# Patient Record
Sex: FEMALE | Race: WHITE | NOT HISPANIC OR LATINO | Employment: FULL TIME
[De-identification: names, ages, dates, MRNs, and addresses within clinical notes are randomized per-mention and may not be internally consistent; named-entity substitution may affect disease eponyms.]

---

## 2017-12-03 ENCOUNTER — HEALTH MAINTENANCE LETTER (OUTPATIENT)
Age: 47
End: 2017-12-03

## 2018-01-01 ENCOUNTER — TRANSFERRED RECORDS (OUTPATIENT)
Dept: HEALTH INFORMATION MANAGEMENT | Facility: CLINIC | Age: 48
End: 2018-01-01

## 2018-04-10 LAB
HPV ABSTRACT: NORMAL
PAP SMEAR - HIM PATIENT REPORTED: NEGATIVE

## 2018-11-05 ENCOUNTER — TELEPHONE (OUTPATIENT)
Dept: FAMILY MEDICINE | Facility: CLINIC | Age: 48
End: 2018-11-05

## 2018-11-05 NOTE — TELEPHONE ENCOUNTER
1. On and off since May 2018 - the symptoms have not worsened since May   2. Worse in am - when needing to go to the bathroom urination and BM, pressure/achiness in abdomen   3. Pain subsides after using restroom, eating, moving around getting on with her day   4. achiness low abdomen more to the right side (has not changed in months)   5. Denies nausea/vomiting/diarrhea - some constipation at times   6. Seems that it goes along with menstrual cycle, menses starting to become more irregular     Plan:   Appointment scheduled in clinic symptoms have been present for months and non urgent at this time   Patient was advised if symptoms worsen this evening to visit urgent care/ER for evaluation     Melissa Mello Registered Nurse   [facility] and [facility]

## 2018-11-06 ENCOUNTER — RADIANT APPOINTMENT (OUTPATIENT)
Dept: GENERAL RADIOLOGY | Facility: CLINIC | Age: 48
End: 2018-11-06
Attending: FAMILY MEDICINE
Payer: COMMERCIAL

## 2018-11-06 ENCOUNTER — OFFICE VISIT (OUTPATIENT)
Dept: FAMILY MEDICINE | Facility: CLINIC | Age: 48
End: 2018-11-06
Payer: COMMERCIAL

## 2018-11-06 VITALS
RESPIRATION RATE: 16 BRPM | SYSTOLIC BLOOD PRESSURE: 113 MMHG | DIASTOLIC BLOOD PRESSURE: 68 MMHG | HEIGHT: 65 IN | HEART RATE: 66 BPM | TEMPERATURE: 96.6 F | WEIGHT: 149.25 LBS | BODY MASS INDEX: 24.87 KG/M2 | OXYGEN SATURATION: 99 %

## 2018-11-06 DIAGNOSIS — R10.31 ABDOMINAL PAIN, RIGHT LOWER QUADRANT: ICD-10-CM

## 2018-11-06 DIAGNOSIS — Z78.9 VEGETARIAN: ICD-10-CM

## 2018-11-06 DIAGNOSIS — R82.90 BAD ODOR OF URINE: ICD-10-CM

## 2018-11-06 DIAGNOSIS — R10.31 RIGHT LOWER QUADRANT ABDOMINAL PAIN: Primary | ICD-10-CM

## 2018-11-06 DIAGNOSIS — N92.6 IRREGULAR MENSTRUAL CYCLE: ICD-10-CM

## 2018-11-06 DIAGNOSIS — Z13.6 ENCOUNTER FOR SCREENING FOR CARDIOVASCULAR DISORDERS: ICD-10-CM

## 2018-11-06 DIAGNOSIS — R82.90 NONSPECIFIC FINDING ON EXAMINATION OF URINE: ICD-10-CM

## 2018-11-06 DIAGNOSIS — R52 PAIN: ICD-10-CM

## 2018-11-06 DIAGNOSIS — Z28.21 INFLUENZA VACCINATION DECLINED: ICD-10-CM

## 2018-11-06 LAB
ALBUMIN UR-MCNC: NEGATIVE MG/DL
APPEARANCE UR: CLEAR
BACTERIA #/AREA URNS HPF: ABNORMAL /HPF
BASOPHILS # BLD AUTO: 0.1 10E9/L (ref 0–0.2)
BASOPHILS NFR BLD AUTO: 0.9 %
BETA HCG QUAL IFA URINE: NEGATIVE
BILIRUB UR QL STRIP: NEGATIVE
COLOR UR AUTO: YELLOW
DIFFERENTIAL METHOD BLD: ABNORMAL
EOSINOPHIL # BLD AUTO: 0.1 10E9/L (ref 0–0.7)
EOSINOPHIL NFR BLD AUTO: 0.9 %
ERYTHROCYTE [DISTWIDTH] IN BLOOD BY AUTOMATED COUNT: 16.6 % (ref 10–15)
GLUCOSE UR STRIP-MCNC: NEGATIVE MG/DL
HCT VFR BLD AUTO: 33.7 % (ref 35–47)
HGB BLD-MCNC: 10.5 G/DL (ref 11.7–15.7)
HGB UR QL STRIP: ABNORMAL
KETONES UR STRIP-MCNC: NEGATIVE MG/DL
LEUKOCYTE ESTERASE UR QL STRIP: ABNORMAL
LYMPHOCYTES # BLD AUTO: 1.7 10E9/L (ref 0.8–5.3)
LYMPHOCYTES NFR BLD AUTO: 30.1 %
MCH RBC QN AUTO: 24 PG (ref 26.5–33)
MCHC RBC AUTO-ENTMCNC: 31.2 G/DL (ref 31.5–36.5)
MCV RBC AUTO: 77 FL (ref 78–100)
MONOCYTES # BLD AUTO: 0.4 10E9/L (ref 0–1.3)
MONOCYTES NFR BLD AUTO: 6.8 %
NEUTROPHILS # BLD AUTO: 3.5 10E9/L (ref 1.6–8.3)
NEUTROPHILS NFR BLD AUTO: 61.3 %
NITRATE UR QL: NEGATIVE
NON-SQ EPI CELLS #/AREA URNS LPF: ABNORMAL /LPF
PH UR STRIP: 7 PH (ref 5–7)
PLATELET # BLD AUTO: 274 10E9/L (ref 150–450)
RBC # BLD AUTO: 4.37 10E12/L (ref 3.8–5.2)
RBC #/AREA URNS AUTO: ABNORMAL /HPF
SOURCE: ABNORMAL
SP GR UR STRIP: 1.01 (ref 1–1.03)
UROBILINOGEN UR STRIP-ACNC: 0.2 EU/DL (ref 0.2–1)
VIT B12 SERPL-MCNC: 687 PG/ML (ref 193–986)
WBC # BLD AUTO: 5.6 10E9/L (ref 4–11)
WBC #/AREA URNS AUTO: ABNORMAL /HPF

## 2018-11-06 PROCEDURE — 83550 IRON BINDING TEST: CPT | Performed by: FAMILY MEDICINE

## 2018-11-06 PROCEDURE — 84443 ASSAY THYROID STIM HORMONE: CPT | Performed by: FAMILY MEDICINE

## 2018-11-06 PROCEDURE — 80053 COMPREHEN METABOLIC PANEL: CPT | Performed by: FAMILY MEDICINE

## 2018-11-06 PROCEDURE — 82607 VITAMIN B-12: CPT | Performed by: FAMILY MEDICINE

## 2018-11-06 PROCEDURE — 99214 OFFICE O/P EST MOD 30 MIN: CPT | Performed by: FAMILY MEDICINE

## 2018-11-06 PROCEDURE — 81001 URINALYSIS AUTO W/SCOPE: CPT | Performed by: FAMILY MEDICINE

## 2018-11-06 PROCEDURE — 83540 ASSAY OF IRON: CPT | Performed by: FAMILY MEDICINE

## 2018-11-06 PROCEDURE — 87086 URINE CULTURE/COLONY COUNT: CPT | Performed by: FAMILY MEDICINE

## 2018-11-06 PROCEDURE — 36415 COLL VENOUS BLD VENIPUNCTURE: CPT | Performed by: FAMILY MEDICINE

## 2018-11-06 PROCEDURE — 85025 COMPLETE CBC W/AUTO DIFF WBC: CPT | Performed by: FAMILY MEDICINE

## 2018-11-06 PROCEDURE — 80061 LIPID PANEL: CPT | Performed by: FAMILY MEDICINE

## 2018-11-06 PROCEDURE — 73502 X-RAY EXAM HIP UNI 2-3 VIEWS: CPT

## 2018-11-06 PROCEDURE — 84703 CHORIONIC GONADOTROPIN ASSAY: CPT | Performed by: FAMILY MEDICINE

## 2018-11-06 NOTE — PROGRESS NOTES
Alice Ms. Fischer,  Some of your results came back as follows  . -Hemoglobin is decreased indicating anemia.  Anemia can cause fatigue and, occasionally, light-headedness.  This is common in menstruating women and is usually caused by an iron deficiency.  Its stable to before and will see if theer is a vit b 12 deficiency too . Rest of labs not back. -White blood cell and platelet counts are normal.  -Urine has some leucocyte esterase but not suggestive of urine infection. Will set it up for culture and see what grows. If you have any further concerns please do not hesitate to contact us by message, phone or making an appointment.  Have a good day   Dr Darius CLINE

## 2018-11-06 NOTE — PROGRESS NOTES
SUBJECTIVE:   Juana Fischer is a 47 year old female who presents to clinic today for the following health issues:    Musculoskeletal problem/pain      Duration:  MAY 2018    Description  Location: Lower right abdominal area ( pelvic)     Per triage on phone : On and off since May 2018 - the symptoms have not worsened since May. Worse in am - when needing to go to the bathroom urination and BM, pressure/achiness in abdomen. Pain subsides after using restroom, eating, moving around getting on with her day. Achiness low abdomen more to the right side (has not changed in months). Denies nausea/vomiting/diarrhea - some constipation at times. Seemed initially that it went along with menstrual cycle, menses starting to become more irregular    Intensity:  moderate    Accompanying signs and symptoms:  Moves around and some swelling, pain is stronger in the morning and decreasing during the day     History  Previous similar problem: no   Previous evaluation:  none    Precipitating or alleviating factors:  Trauma or overuse: no   Aggravating factors include: none  Therapies tried and outcome: started a food journal and tracking menstrual cycle     - Declines the  FLU VACCINE   Former smoker, new to me, limited records, prior colonoscopy for rectal bleeding in 2016 considered due to internal hemorrhoids that were not bleeding at time of scope, was advised to increase fiber and fluid at that time. And recheck in 10 yrs. [place]  negative.  Notes symptoms sort of happened suddenly but going on since may 2018. Started to get ache in pelvis, right side, thought initially was menstrual cramps, was watching that for a while monitoring when ovulating. Has noted periods getting more uneven, trying to get attention to that path. Then realized symptoms were not lining up to ovulation or premenstrual cramping. Then started looking at her diet, if Food was making her constipated or not. Has bene a vegetarian a long time and has  avoided airy. A while ago stopped wheat when had joint pain and that helped but has been eating wheat all along since then. Has not tried any food restrictions with these symptom though recently has been cutting back on fibrous foods.   Couldn't figure out any correlation with food either.  Discomfort comes and goes. Not daily, feels usually at night or early am before wakes up, feels on sitting & then goes to the bathroom, and feels after that a bit better but still there. Its sharpest in the am and as the day goes by it becomes a dull ache. Like right now ( noon time ) she feels no pain. pain is usually on the right but it sometimes seems to move to left.     Also recently feels like the walls of her uterus lining are drooping. Can feel when wipes like something is pushing out. Feels swollen. Had get a pelvic pap smear in 2018 and was told everything was normal then. Has had Hx of vaginal polyps before that were normal at a gyn office.    No fever or chills, usually always cold, energy level ebbs and flows, occasional headache, no dizziness, no double or blurry vision, no facial pain, earaches tiny bit, no sore throat, runny nose, post nasal drip, no trouble hearing, smelling, tasting or swallowing, no cough, no chest pain, trouble breathing or palpitations, No heart burn, reflux, nausea or vomiting, tends towards constipation, sometimes goes and still need to go more after that. Other times its normal, no blood in stools or black stools, no weight loss or night sweats. No dysuria, hematuria, frequency, urgency, hesitancy, incontinence, No unusual vag discharge daughter told her, her urine stinks wonders if she has an infection. No leg swelling or joint pain other than some mild Left thumb arthritis. No rash.    Has one daughter age 14 born , No complications. LMP oct 15th     Problem list and histories reviewed & adjusted, as indicated.  Additional history: as documented    Patient Active Problem List    Diagnosis     Rectal bleeding     Past Surgical History:   Procedure Laterality Date     COLONOSCOPY N/A 7/26/2016    Procedure: COLONOSCOPY;  Surgeon: Eber Burns MD;  Location:  GI       Social History   Substance Use Topics     Smoking status: Former Smoker     Quit date: 1/1/1997     Smokeless tobacco: Never Used     Alcohol use 0.0 oz/week     0 Standard drinks or equivalent per week      Comment: Rarely     Family History   Problem Relation Age of Onset     Cancer Father      lung CA, smoker     Alcoholism Father      Alcoholism Mother      Thyroid Disease Mother      Cancer Maternal Grandmother      Breast CA     Alzheimer Disease Paternal Grandmother      Diabetes No family hx of      Coronary Artery Disease No family hx of      Hypertension No family hx of      Hyperlipidemia No family hx of      Cerebrovascular Disease No family hx of      Breast Cancer No family hx of      Colon Cancer No family hx of      Prostate Cancer No family hx of      Other Cancer No family hx of      Depression No family hx of      Anxiety Disorder No family hx of      Mental Illness No family hx of      Substance Abuse No family hx of      Anesthesia Reaction No family hx of      Asthma No family hx of      Osteoporosis No family hx of      Genetic Disorder No family hx of      Obesity No family hx of      Unknown/Adopted No family hx of          No current outpatient prescriptions on file.     Allergies   Allergen Reactions     Sulfa Drugs      Recent Labs   Lab Test  11/06/18   1204  02/02/11   0955   LDL  66   --    HDL  84   --    TRIG  102   --    ALT  21  11   CR  0.79  0.80   GFRESTIMATED  77  79   GFRESTBLACK  >90  >90   POTASSIUM  4.1  4.0   TSH  2.19   --       BP Readings from Last 3 Encounters:   11/06/18 113/68   07/26/16 127/74   07/15/16 104/70    Wt Readings from Last 3 Encounters:   11/06/18 149 lb 4 oz (67.7 kg)   07/26/16 150 lb (68 kg)   07/15/16 150 lb (68 kg)                  Labs reviewed in  "EPIC    Reviewed and updated as needed this visit by clinical staff  Tobacco  Allergies  Meds  Med Hx  Surg Hx  Fam Hx  Soc Hx      Reviewed and updated as needed this visit by Provider         ROS:  Constitutional, HEENT, cardiovascular, pulmonary, GI, , musculoskeletal, neuro, skin, endocrine and psych systems are negative, except as otherwise noted.    OBJECTIVE:     /68 (BP Location: Left arm, Patient Position: Chair, Cuff Size: Adult Regular)  Pulse 66  Temp 96.6  F (35.9  C) (Tympanic)  Resp 16  Ht 5' 5.25\" (1.657 m)  Wt 149 lb 4 oz (67.7 kg)  LMP 10/15/2018  SpO2 99%  BMI 24.65 kg/m2  Body mass index is 24.65 kg/(m^2).  GENERAL: healthy, alert and no distress  EYES: Eyes grossly normal to inspection, PERRL and conjunctivae and sclerae normal  HENT: ear canals and TM's normal, nose and mouth without ulcers or lesions  NECK: no adenopathy, no asymmetry, masses, or scars and thyroid normal to palpation  RESP: lungs clear to auscultation - no rales, rhonchi or wheezes  CV: regular rate and rhythm, normal S1 S2, no S3 or S4, no murmur, click or rub, no peripheral edema and peripheral pulses strong  ABDOMEN: soft, non tender, no hepatosplenomegaly, no masses and bowel sounds normal  RLQ discomfort, no rebound, no acute abdomen, no hernia or rash seen. No CVA tenderness. SLR negative. SANDRA negative.   MS: no gross musculoskeletal defects noted, no edema  SKIN: no suspicious lesions or rashes  NEURO: Normal strength and tone, mentation intact and speech normal  PSYCH: mentation appears normal, affect normal/bright  LYMPH: no cervical, supraclavicular, axillary, or inguinal adenopathy    Diagnostic Test Results:  No results found for this or any previous visit (from the past 24 hour(s)).    ASSESSMENT/PLAN:     1. Right lower quadrant abdominal pain  DDX: appendicitis ( less likely given duration) versus ileitis/ diverticulitis/ IBD( no bleeding or fever or weight loss) versus hernia ( none seen " or felt ) versus radicular pain from back ( no back pain) versus right hip arthritis ( would not account for occasional left sided symptoms unless has arthritis there too) versus fibroid versus ovarian cyst versus constipation( has hx of this ). Currently no red flag sign. Unclear etiology/diagnosis with uncertain prognosis requiring further workup below. Will do Labs, ultrasound pelvis &, hip xray today to further evaluate. B supplements could cause odor to urine but will check UA. See back in 2 weeks. She can look up low FODMAPs on line. Looks like already started some version of thi recently.no change noted but if has a functional bowel would take 4 to 6 weeks to see any difference after elimination. Consider GI eval . Colonoscopy at least in 2016 was unremarkable then except for internal hemorrhoids. If no answer consider a ct of abdomen. Declined the flu shot.      - UA reflex to Microscopic and Culture  - CBC with platelets differential  - Comprehensive metabolic panel  - TSH with free T4 reflex  - US Pelvic Complete w Transvaginal; Future  - GASTROENTEROLOGY ADULT REF CONSULT ONLY  - Beta HCG Qual, Urine - [facility] and [facility] ([id number])  - Iron and iron binding capacity  - Vitamin B12  - Urine Microscopic    2. Irregular menstrual cycle  Could be perimenopausal.   - CBC with platelets differential  - TSH with free T4 reflex  - US Pelvic Complete w Transvaginal; Future  - Beta HCG Qual, Urine - [facility] and [facility] ([id number])    3. Vegetarian  Notes hx of anemia.   - Iron and iron binding capacity  - Vitamin B12    4. Bad odor of urine  B supplements could cause odor to urine but will check UA.   _ UA & Cx sent    5. Nonspecific finding on examination of urine  - Urine Culture Aerobic Bacterial    6. Encounter for screening for cardiovascular disorders  - Lipid panel reflex to direct LDL Fasting    7. Influenza vaccination declined    See Patient Instructions    Reena Mccoy MD  [facility]

## 2018-11-06 NOTE — PATIENT INSTRUCTIONS
Labs, ultrasound, hip xray today   b supplements can cause odor to urine  See you back in 2 week s  Look up  low FODMAPs on line

## 2018-11-06 NOTE — MR AVS SNAPSHOT
After Visit Summary   11/6/2018    Juana Fischer    MRN: [id number]           Patient Information     Date Of Birth          1970        Visit Information        Provider Department      11/6/2018 11:00 AM Reena Mccoy MD [facility]        Today's Diagnoses     Right lower quadrant abdominal pain    -  1    Irregular menstrual cycle        Need for prophylactic vaccination and inoculation against influenza        Encounter for screening for cardiovascular disorders        Vegetarian        Bad odor of urine          Care Instructions    Labs, ultrasound, hip xray today   b supplements can cause odor to urine  See you back in 2 week s  Look up  low FODMAPs on line               Follow-ups after your visit        Additional Services     GASTROENTEROLOGY ADULT REF CONSULT ONLY       Preferred Location: [facility] [phone], [facility] [phone] and [facility] [phone]      Please be aware that coverage of these services is subject to the terms and limitations of your health insurance plan.  Call member services at your health plan with any benefit or coverage questions.  Any procedures must be performed at a [place] facility OR coordinated by your clinic's referral office.    Please bring the following with you to your appointment:    (1) Any X-Rays, CTs or MRIs which have been performed.  Contact the facility where they were done to arrange for  prior to your scheduled appointment.    (2) List of current medications   (3) This referral request   (4) Any documents/labs given to you for this referral                  Follow-up notes from your care team     Return in about 2 weeks (around 11/20/2018) for Physical Exam with PCP of choice.      Your next 10 appointments already scheduled     Nov 09, 2018  1:45 PM CST   US PELVIC COMPLETE W TRANSVAGINAL with [id number]   [facility] ([facility])    [address] [id number] [phone]           How do I prepare for my exam? (Food and drink instructions) Adults: Drink four 8-ounce glasses of fluid an hour before your exam. If you need to empty your bladder before your exam, try to release only a little urine. Then, drink another glass of fluid.  Children: * Children who are potty trained up to 6 years old should drink at least 2 cups (16 oz) of water/non-carbonated beverage 30 minutes prior to the exam. * Children who are 6-10 years should drink at least 3 cups (24 oz) of water/non-carbonated beverage 45 minutes prior to the exam. * Children who are 10 years or older should drink at least 4 cups (32 oz) of water/non-carbonated beverage 45 minutes prior to the exam.  If your child is very uncomfortable or has an urgent need to pee, please notify a technologist; they will try to find out how much longer the wait may be and provide instructions to help relieve the pressure.  What should I wear: Wear comfortable clothes.  How long does the exam take: Most ultrasounds take 30 to 60 minutes.  What should I bring: Bring a list of your medicines, including vitamins, minerals and over-the-counter drugs. It is safest to leave personal items at home.  Do I need a :  No  is needed.  What do I need to tell my doctor: Tell your doctor about any allergies you may have.  What should I do after the exam: No restrictions, You may resume normal activities.  What is this test: An ultrasound uses sound waves to make pictures of the body. Sound waves do not cause pain. The only discomfort may be the pressure of the wand against your skin or full bladder.  Who should I call with questions: If you have any questions, please call the Imaging Department where you will have your exam. Directions, parking instructions, and other information is available on our website, [email or web address].            Nov 26, 2018  1:40 PM CST   PHYSICAL with  "Reena Mccoy MD   [facility]    [address] [place] [id number] [phone]              Future tests that were ordered for you today     Open Future Orders        Priority Expected Expires Ordered    US Pelvic Complete w Transvaginal Routine  11/6/2019 11/6/2018            Who to contact     If you have questions or need follow up information about today's clinic visit or your schedule please contact [facility] directly at [phone].  Normal or non-critical lab and imaging results will be communicated to you by [employer], letter or phone within 4 business days after the clinic has received the results. If you do not hear from us within 7 days, please contact the clinic through "[employer]"t or phone. If you have a critical or abnormal lab result, we will notify you by phone as soon as possible.  Submit refill requests through [employer] or call your pharmacy and they will forward the refill request to us. Please allow 3 business days for your refill to be completed.          Additional Information About Your Visit        [employer] Information     [employer] gives you secure access to your electronic health record. If you see a primary care provider, you can also send messages to your care team and make appointments. If you have questions, please call your primary care clinic.  If you do not have a primary care provider, please call [phone] and they will assist you.        Care EveryWhere ID     This is your Care EveryWhere ID. This could be used by other organizations to access your [facility] medical records  [id number]        Your Vitals Were     Pulse Temperature Respirations Height Last Period Pulse Oximetry    66 96.6  F (35.9  C) (Tympanic) 16 5' 5.25\" (1.657 m) 10/15/2018 99%    BMI (Body Mass Index)                   24.65 kg/m2            Blood Pressure from Last 3 Encounters:   11/06/18 113/68   07/26/16 127/74   07/15/16 104/70    " Weight from Last 3 Encounters:   11/06/18 149 lb 4 oz (67.7 kg)   07/26/16 150 lb (68 kg)   07/15/16 150 lb (68 kg)              We Performed the Following     Beta HCG Qual, Urine - [facility] and [facility] ([id number])     CBC with platelets differential     Comprehensive metabolic panel     GASTROENTEROLOGY ADULT REF CONSULT ONLY     Iron and iron binding capacity     Lipid panel reflex to direct LDL Fasting     TSH with free T4 reflex     UA reflex to Microscopic and Culture     UA reflex to Microscopic and Culture     Vitamin B12        Primary Care Provider Office Phone # Fax #    Reena Mccoy [phone]       [address]  [facility] [address]        Equal Access to Services     JACKI NASCIMENTO : Hadii yoli Garcia, wastacyda maco, lisseth kaalmada rhett, bryan rashid . So [facility] [phone].    ATENCIÓN: Si habla español, tiene a rao disposición servicios gratuitos de asistencia lingüística. [facility] [phone].    We comply with applicable federal civil rights laws and [place] laws. We do not discriminate on the basis of race, color, national origin, age, disability, sex, sexual orientation, or gender identity.            Thank you!     Thank you for choosing [facility]  for your care. Our goal is always to provide you with excellent care. Hearing back from our patients is one way we can continue to improve our services. Please take a few minutes to complete the written survey that you may receive in the mail after your visit with us. Thank you!             Your Updated Medication List - Protect others around you: Learn how to safely use, store and throw away your medicines at www.disposemymeds.org.      Notice  As of 11/6/2018 11:56 AM    You have not been prescribed any medications.

## 2018-11-06 NOTE — PROGRESS NOTES
Alice Fischer,  Your results came back and  Urine microscopy suggests collection contamination due to presence of skin cells. Will see what culture grows . If we see any bacteria growing on culture then would make a recommendation to take an antibiotic at that time.  If you have any further concerns please do not hesitate to contact us by message, phone or making an appointment.  Have a good day   Dr Darius CLINE

## 2018-11-07 PROBLEM — Z12.4 CERVICAL CANCER SCREENING: Status: ACTIVE | Noted: 2018-04-17

## 2018-11-07 LAB
ALBUMIN SERPL-MCNC: 3.7 G/DL (ref 3.4–5)
ALP SERPL-CCNC: 58 U/L (ref 40–150)
ALT SERPL W P-5'-P-CCNC: 21 U/L (ref 0–50)
ANION GAP SERPL CALCULATED.3IONS-SCNC: 11 MMOL/L (ref 3–14)
AST SERPL W P-5'-P-CCNC: 16 U/L (ref 0–45)
BACTERIA SPEC CULT: NORMAL
BACTERIA SPEC CULT: NORMAL
BILIRUB SERPL-MCNC: 0.2 MG/DL (ref 0.2–1.3)
BUN SERPL-MCNC: 8 MG/DL (ref 7–30)
CALCIUM SERPL-MCNC: 8.8 MG/DL (ref 8.5–10.1)
CHLORIDE SERPL-SCNC: 105 MMOL/L (ref 94–109)
CHOLEST SERPL-MCNC: 170 MG/DL
CO2 SERPL-SCNC: 24 MMOL/L (ref 20–32)
CREAT SERPL-MCNC: 0.79 MG/DL (ref 0.52–1.04)
GFR SERPL CREATININE-BSD FRML MDRD: 77 ML/MIN/1.7M2
GLUCOSE SERPL-MCNC: 77 MG/DL (ref 70–99)
HDLC SERPL-MCNC: 84 MG/DL
IRON SATN MFR SERPL: 5 % (ref 15–46)
IRON SERPL-MCNC: 23 UG/DL (ref 35–180)
LDLC SERPL CALC-MCNC: 66 MG/DL
NONHDLC SERPL-MCNC: 86 MG/DL
POTASSIUM SERPL-SCNC: 4.1 MMOL/L (ref 3.4–5.3)
PROT SERPL-MCNC: 7.4 G/DL (ref 6.8–8.8)
SODIUM SERPL-SCNC: 140 MMOL/L (ref 133–144)
SPECIMEN SOURCE: NORMAL
TIBC SERPL-MCNC: 462 UG/DL (ref 240–430)
TRIGL SERPL-MCNC: 102 MG/DL
TSH SERPL DL<=0.005 MIU/L-ACNC: 2.19 MU/L (ref 0.4–4)

## 2018-11-07 NOTE — PROGRESS NOTES
Alice Fischer,  Your results came back showing some right hip arthritis. We can talk about referral to ortho when I see you back for this.  If you have any further concerns please do not hesitate to contact us by message, phone or making an appointment.  Have a good day   Dr Darius CLINE

## 2018-11-07 NOTE — PROGRESS NOTES
Alice Fischer,  Your final urine culture results came back and show no infection.  If you have any further concerns please do not hesitate to contact us by message, phone or making an appointment.  Have a good day   Dr Darius CLINE

## 2018-11-07 NOTE — PROGRESS NOTES
Alice Ms. Peresnadja,  Your results came back and are within acceptable limits. -Liver and gallbladder tests are normal (ALT,AST, Alk phos, bilirubin), kidney function is normal (Cr, GFR), sodium is normal, potassium is normal, calcium is normal, glucose is normal.  -LDL(bad) cholesterol level is normal  -TSH (thyroid stimulating hormone) level is normal which indicates normal thyroid function.. If you have any further concerns please do not hesitate to contact us by message, phone or making an appointment.  Have a good day   Dr Darius CLINE

## 2018-11-07 NOTE — PROGRESS NOTES
Alice Fischer,  Your results show you have low iron levels. . If you have any further concerns please do not hesitate to contact us by message, phone or making an appointment.  Have a good day   Dr Darius CLINE

## 2018-11-07 NOTE — PROGRESS NOTES
Alice Fischer,  Your results came back with a normal vitamin B 12.  If you have any further concerns please do not hesitate to contact us by message, phone or making an appointment.  Have a good day   Dr Darius CLINE

## 2018-11-09 ENCOUNTER — TELEPHONE (OUTPATIENT)
Dept: FAMILY MEDICINE | Facility: CLINIC | Age: 48
End: 2018-11-09

## 2018-11-09 ENCOUNTER — RADIANT APPOINTMENT (OUTPATIENT)
Dept: ULTRASOUND IMAGING | Facility: CLINIC | Age: 48
End: 2018-11-09
Attending: FAMILY MEDICINE
Payer: COMMERCIAL

## 2018-11-09 DIAGNOSIS — R10.31 RIGHT LOWER QUADRANT ABDOMINAL PAIN: ICD-10-CM

## 2018-11-09 DIAGNOSIS — N92.6 IRREGULAR MENSTRUAL CYCLE: ICD-10-CM

## 2018-11-09 DIAGNOSIS — N85.00 ENDOMETRIAL HYPERPLASIA: Primary | ICD-10-CM

## 2018-11-09 NOTE — TELEPHONE ENCOUNTER
Left message on machine to call back.  Ask to speak to an RN, let them know it's a return call.    Leave a number and time that you can be reached.   Carisa Collins RN

## 2018-11-09 NOTE — TELEPHONE ENCOUNTER
Message  below given to patient     She will await call from OBGYN to schedule and if she has not heard from by next week she will return call to clinic    Melissa Mello Registered Nurse   [facility] and [facility]

## 2018-11-09 NOTE — TELEPHONE ENCOUNTER
Covering for Dr Mccoy as DOD.    Please call patient - has endometrial hyperplasia.  She should see Dr. Deluca for follow-up.  I have done a referral for her.  It may be an incidental finding and it may not be the main cause of her abdominal pain but OB/GYN should see her as endometrial thickness is more than usual.      Results for orders placed or performed in visit on 11/09/18   US Pelvic Complete w Transvaginal    Narrative    EXAMINATION: US PELVIC COMPLETE WITH TRANSVAGINAL, 11/9/2018 2:38 PM     COMPARISON: None.    HISTORY: Right lower quadrant abdominal pain. Irregular menstrual  cycle.    TECHNIQUE: The pelvis was scanned in standard fashion with  transabdominal and transvaginal transducer(s) using both grey scale  and color Doppler techniques.    FINDINGS  The uterus measures 10.3 x 4.7 x 6.2 cm, and there is no evidence of a  focal fibroid. There are tiny subendometrial cystic changes with  endometrial/myometrial indistinctness. Endometrium measures 22 mm,  demonstrates mild heterogeneity. Small amount of free fluid adjacent  the right ovary. Small cervical nabothian cysts.    The right ovary measures 4.0 x 2.5 x 2.3 and the left ovary measures  1.3 x 1.2 x 2.0. Several prominent simple ovarian cysts with the  largest measuring 2.1 x 2.1 x 2.1 cm without internal septations or  vascularity. There is normal blood flow to the ovaries.        Impression    IMPRESSION:   Abnormally thickened endometrium with mild heterogeneity, measuring up  to 22 mm. Consider OB/GYN consultation, if not already obtained.    Few subendometrial cysts, along with enlarged/bulbous uterus. These  findings can be seen with adenomyosis.    I have personally reviewed the examination and initial interpretation  and I agree with the findings.    RUSSELL OROZCO MD

## 2018-11-25 ASSESSMENT — ENCOUNTER SYMPTOMS
JOINT SWELLING: 0
NERVOUS/ANXIOUS: 0
FEVER: 0
MYALGIAS: 1
HEMATURIA: 0
HEADACHES: 0
COUGH: 0
NAUSEA: 0
DYSURIA: 0
CHILLS: 0
SHORTNESS OF BREATH: 0
EYE PAIN: 0
PALPITATIONS: 0
PARESTHESIAS: 0
BREAST MASS: 0
HEARTBURN: 0
ARTHRALGIAS: 1
ABDOMINAL PAIN: 1
WEAKNESS: 1
CONSTIPATION: 1
FREQUENCY: 0
DIZZINESS: 0
SORE THROAT: 0
HEMATOCHEZIA: 1
DIARRHEA: 1

## 2018-11-26 ENCOUNTER — OFFICE VISIT (OUTPATIENT)
Dept: FAMILY MEDICINE | Facility: CLINIC | Age: 48
End: 2018-11-26
Payer: COMMERCIAL

## 2018-11-26 VITALS
BODY MASS INDEX: 24.91 KG/M2 | WEIGHT: 149.5 LBS | RESPIRATION RATE: 13 BRPM | SYSTOLIC BLOOD PRESSURE: 121 MMHG | OXYGEN SATURATION: 100 % | TEMPERATURE: 97.7 F | DIASTOLIC BLOOD PRESSURE: 78 MMHG | HEIGHT: 65 IN | HEART RATE: 76 BPM

## 2018-11-26 DIAGNOSIS — Z78.9 VEGETARIAN: ICD-10-CM

## 2018-11-26 DIAGNOSIS — N92.6 IRREGULAR MENSTRUAL CYCLE: ICD-10-CM

## 2018-11-26 DIAGNOSIS — Z28.21 INFLUENZA VACCINATION DECLINED: ICD-10-CM

## 2018-11-26 DIAGNOSIS — Z00.00 ROUTINE HISTORY AND PHYSICAL EXAMINATION OF ADULT: Primary | ICD-10-CM

## 2018-11-26 DIAGNOSIS — K62.5 RECTAL BLEEDING: ICD-10-CM

## 2018-11-26 DIAGNOSIS — R53.1 WEAKNESS GENERALIZED: ICD-10-CM

## 2018-11-26 DIAGNOSIS — M79.10 MYALGIA: ICD-10-CM

## 2018-11-26 DIAGNOSIS — R10.31 ABDOMINAL PAIN, RIGHT LOWER QUADRANT: ICD-10-CM

## 2018-11-26 DIAGNOSIS — R19.8 ALTERNATING CONSTIPATION AND DIARRHEA: ICD-10-CM

## 2018-11-26 DIAGNOSIS — M25.50 ARTHRALGIA, UNSPECIFIED JOINT: ICD-10-CM

## 2018-11-26 DIAGNOSIS — R10.2 PELVIC PAIN IN FEMALE: ICD-10-CM

## 2018-11-26 DIAGNOSIS — N93.9 VAGINAL BLEEDING: ICD-10-CM

## 2018-11-26 DIAGNOSIS — R93.89 ENDOMETRIAL THICKENING ON ULTRASOUND: ICD-10-CM

## 2018-11-26 DIAGNOSIS — D50.0 IRON DEFICIENCY ANEMIA DUE TO CHRONIC BLOOD LOSS: ICD-10-CM

## 2018-11-26 DIAGNOSIS — M16.11 ARTHRITIS OF RIGHT HIP: ICD-10-CM

## 2018-11-26 PROCEDURE — 99396 PREV VISIT EST AGE 40-64: CPT | Performed by: FAMILY MEDICINE

## 2018-11-26 PROCEDURE — 99214 OFFICE O/P EST MOD 30 MIN: CPT | Mod: 25 | Performed by: FAMILY MEDICINE

## 2018-11-26 ASSESSMENT — ENCOUNTER SYMPTOMS
DIZZINESS: 0
PARESTHESIAS: 0
HEADACHES: 0
FEVER: 0
BREAST MASS: 0
ARTHRALGIAS: 1
DIARRHEA: 1
FREQUENCY: 0
CONSTIPATION: 1
COUGH: 0
HEMATURIA: 0
MYALGIAS: 1
SORE THROAT: 0
WEAKNESS: 1
DYSURIA: 0
NAUSEA: 0
CHILLS: 0
HEARTBURN: 0
EYE PAIN: 0
HEMATOCHEZIA: 1
JOINT SWELLING: 0
SHORTNESS OF BREATH: 0
PALPITATIONS: 0
ABDOMINAL PAIN: 1
NERVOUS/ANXIOUS: 0

## 2018-11-26 NOTE — PROGRESS NOTES
"   SUBJECTIVE:   CC: Juana Fischer is an 48 year old woman who presents for preventive health visit.     Healthy Habits:    Do you get at least three servings of calcium containing foods daily (dairy, green leafy vegetables, etc.)? { :675045::\"yes\"}    Amount of exercise or daily activities, outside of work: { :946885}    Problems taking medications regularly { :568233::\"No\"}    Medication side effects: { :504457::\"No\"}    Have you had an eye exam in the past two years? { :748431}    Do you see a dentist twice per year? { :310081}    Do you have sleep apnea, excessive snoring or daytime drowsiness?{ :965292}  {Outside tests to abstract? :335577}    {additional problems to add (Optional):658569}    Today's PHQ-2 Score:   PHQ-2 ( 1999 [employer]) 11/25/2018 11/6/2018   Q1: Little interest or pleasure in doing things 0 0   Q2: Feeling down, depressed or hopeless 0 0   PHQ-2 Score 0 0   Q1: Little interest or pleasure in doing things Not at all -   Q2: Feeling down, depressed or hopeless Not at all -   PHQ-2 Score 0 -     {PHQ-2 LOOK IN ASSESSMENTS (Optional) :014936}  Abuse: Current or Past(Physical, Sexual or Emotional)- {YES/NO/NA:073879}  Do you feel safe in your environment? {YES/NO/NA:457209}    Social History   Substance Use Topics     Smoking status: Former Smoker     Quit date: 1/1/1997     Smokeless tobacco: Never Used     Alcohol use 0.0 oz/week     0 Standard drinks or equivalent per week      Comment: Rarely     If you drink alcohol do you typically have >3 drinks per day or >7 drinks per week? {ETOH :423281}                     Reviewed orders with patient.  Reviewed health maintenance and updated orders accordingly - {Yes/No:431415::\"Yes\"}  {Chronicprobdata (Optional):667017}    {Mammo Decision Support (Optional):642551}    Pertinent mammograms are reviewed under the imaging tab.  History of abnormal Pap smear: {PAP HX:285075}     Reviewed and updated as needed this visit by clinical staff  Tobacco  " "Allergies  Meds  Med Hx  Surg Hx  Fam Hx  Soc Hx        Reviewed and updated as needed this visit by Provider        {HISTORY OPTIONS (Optional):088921}    ROS:  { :936260}    OBJECTIVE:   There were no vitals taken for this visit.  EXAM:  {Exam Choices:101998}    {Diagnostic Test Results (Optional):555980::\"Diagnostic Test Results:\",\"none \"}    ASSESSMENT/PLAN:   {Diag Picklist:490306}    COUNSELING:   {FEMALE COUNSELING MESSAGES:971021::\"Reviewed preventive health counseling, as reflected in patient instructions\"}    BP Readings from Last 1 Encounters:   11/06/18 113/68     Estimated body mass index is 24.65 kg/(m^2) as calculated from the following:    Height as of 11/6/18: 5' 5.25\" (1.657 m).    Weight as of 11/6/18: 149 lb 4 oz (67.7 kg).    {BP Counseling- Complete if BP >= 120/80  (Optional):739152}  {Weight Management Plan (ACO) Complete if BMI is abnormal-  Ages 18-64  BMI >24.9.  Age 65+ with BMI <23 or >30 (Optional):892568}     reports that she quit smoking about 21 years ago. She has never used smokeless tobacco.  {Tobacco Cessation -- Complete if patient is a smoker (Optional):479961}    Counseling Resources:  ATP IV Guidelines  Pooled Cohorts Equation Calculator  Breast Cancer Risk Calculator  FRAX Risk Assessment  ICSI Preventive Guidelines  Dietary Guidelines for Americans, 2010  USDA's MyPlate  ASA Prophylaxis  Lung CA Screening    Reena Mccoy MD  [facility]  "

## 2018-11-26 NOTE — PATIENT INSTRUCTIONS
Breast exam normal  mammogram starting age 50  Pelvic/pap/ HPV due 4/2021  See gyn for endometrial thickening  See Gi for for gut issues  See ortho for right hip pain  See hematology after that for anemia  recheck iron, ferritin and cbc in 2 months on increased iron through diet and or supplements     Preventive Health Recommendations  Female Ages 40 to 49    Yearly exam:     See your health care provider every year in order to  1. Review health changes.   2. Discuss preventive care.    3. Review your medicines if your doctor prescribed any.      Get a Pap test every three years (unless you have an abnormal result and your provider advises testing more often).      If you get Pap tests with HPV test, you only need to test every 5 years, unless you have an abnormal result. You do not need a Pap test if your uterus was removed (hysterectomy) and you have not had cancer.      You should be tested each year for STDs (sexually transmitted diseases), if you're at risk.     Ask your doctor if you should have a mammogram.      Have a colonoscopy (test for colon cancer) if someone in your family has had colon cancer or polyps before age 50.       Have a cholesterol test every 5 years.       Have a diabetes test (fasting glucose) after age 45. If you are at risk for diabetes, you should have this test every 3 years.    Shots: Get a flu shot each year. Get a tetanus shot every 10 years.     Nutrition:     Eat at least 5 servings of fruits and vegetables each day.    Eat whole-grain bread, whole-wheat pasta and brown rice instead of white grains and rice.    Get adequate Calcium and Vitamin D.      Lifestyle    Exercise at least 150 minutes a week (an average of 30 minutes a day, 5 days a week). This will help you control your weight and prevent disease.    Limit alcohol to one drink per day.    No smoking.     Wear sunscreen to prevent skin cancer.    See your dentist every six months for an exam and cleaning.

## 2018-11-26 NOTE — PROGRESS NOTES
SUBJECTIVE:   CC: Juana Fischer is an 48 year old woman who presents for preventive health visit.     Physical   Annual:     Getting at least 3 servings of Calcium per day:  Yes    Bi-annual eye exam:  Yes    Dental care twice a year:  Yes    Sleep apnea or symptoms of sleep apnea:  None    Diet:  Vegetarian/vegan and Gluten-free/reduced    Frequency of exercise:  2-3 days/week    Duration of exercise:  15-30 minutes    Taking medications regularly:  Not Applicable    Additional concerns today:  Yes    PHQ-2 Total Score: 0    Medical assistant advised patient about addressing additional health concerns that could be billed, as it is not considered a part of a preventive physical. Patient verbalized understanding.  Reena Mccoy MD on 11/26/2018 at 1:51 PM  - Declines FLU VACCINE     Pt is also here to go over lab results form last OV    Feels mostly good. Headache today as getting her period, No fever or chills, no  dizziness, no double or blurry vision, no facial pain, earache, sore throat, runny nose, post nasal drip, no trouble hearing, smelling, tasting or swallowing, no cough , no chest pain, trouble breathing or palpitations, RLQ pain as before not unbearable, no heart burn, reflux, nausea or vomiting has alternating constipation and diarrhea, , pelvic pain, rectal blood bright red small amount on wiping only, prior colonoscopy normal except for int hemorrhoids, body aches at time, low energy, no blood in stools or black stools, no weight loss or night sweats. No dysuria, hematuria, frequency, urgency, hesitancy, incontinence, No leg swelling or joint pain. No rash.    Former smoker, history of anemia long-standing diagnosed at age 5 and then chronically anemic lost track of it initially as a child for 9 fortified cereal than not addressed until there was an adult and came up as a diagnosis, eustachian tube dysfunction right, right foot pain, resolved pleuritis, right hip pain, bright red blood per  rectum status post colonoscopy which showed internal hemorrhoids non bleeding in 2016, allergic to sulfa drugs, seen for the first time November 6, 2018 for right lower quadrant pain, irregular menstrual bleeding, pelvic pain, rectal bleeding, alternating constipation and diarrhea, body aches, generalized weakness.  He is a vegetarian.  Workup revealed a normal exam.  CBC showed mild anemia hemoglobin of 10.5 with microcytic hypochromic indices and low iron of 23, normal B12, x-ray showed mild right hip arthritis worse since 2013 and CMP, lipids, TSH were normal and urine culture done for questionable UA was unremarkable. pelvic u/S 11/9 showed an abnormally thickened endometrium with mild heterogeneity, measuring up to 22 mm. Advised to consider OB/GYN consultation,    Did look at the low foot maps diet did not notice any change but has not tried it long enough.  Wondering about a probiotic and seen GI.  Declines mammogram for now no family history of breast cancer except in grandmother who got it in her old age.  Pelvic Pap up-to-date and due again in 2021.  Declines flu shot and HIV testing.  Notes that her insurance is changing and she got a new job to start in December and will be switching to [facility] [employer].  So declines any referrals currently will proceed with him with the new insurance.  Has a trouble taking iron supplements as they just make her constipation worse.  Plus being a vegetarian it is hard for her to get vegetarian iron supplements that worked for her.  Would be interested in seeing a hematologist to workup to GYN and GI comfort and I think.  Is wondering about endometriosis.    Today's PHQ-2 Score:   PHQ-2 ( 1999 Pf[employer]) 11/26/2018   Q1: Little interest or pleasure in doing things 0   Q2: Feeling down, depressed or hopeless 0   PHQ-2 Score 0   Q1: Little interest or pleasure in doing things -   Q2: Feeling down, depressed or hopeless -   PHQ-2 Score -       Abuse: Current or Past(Physical,  Sexual or Emotional)- No  Do you feel safe in your environment? Yes    Social History   Substance Use Topics     Smoking status: Former Smoker     Quit date: 1/1/1997     Smokeless tobacco: Never Used     Alcohol use 0.0 oz/week     0 Standard drinks or equivalent per week      Comment: Rarely     Alcohol Use 11/25/2018   If you drink alcohol do you typically have greater than 3 drinks per day OR greater than 7 drinks per week? No   No flowsheet data found.    Reviewed orders with patient.  Reviewed health maintenance and updated orders accordingly - Yes  Labs reviewed in EPIC  BP Readings from Last 3 Encounters:   11/26/18 121/78   11/06/18 113/68   07/26/16 127/74    Wt Readings from Last 3 Encounters:   11/26/18 149 lb 8 oz (67.8 kg)   11/06/18 149 lb 4 oz (67.7 kg)   07/26/16 150 lb (68 kg)                  Patient Active Problem List   Diagnosis     Cervical cancer screening     Endometrial thickening on ultrasound     Iron deficiency anemia due to chronic blood loss     Irregular menstrual cycle     Pelvic pain in female     Abdominal pain, right lower quadrant     Alternating constipation and diarrhea     Vegetarian     Arthritis of right hip     Past Surgical History:   Procedure Laterality Date     COLONOSCOPY N/A 7/26/2016    Procedure: COLONOSCOPY;  Surgeon: Eber Burns MD;  Location:  GI       Social History   Substance Use Topics     Smoking status: Former Smoker     Quit date: 1/1/1997     Smokeless tobacco: Never Used     Alcohol use 0.0 oz/week     0 Standard drinks or equivalent per week      Comment: Rarely     Family History   Problem Relation Age of Onset     Cancer Father      lung CA, smoker     Alcoholism Father      Alcoholism Mother      Thyroid Disease Mother      Cancer Maternal Grandmother      Breast CA     Alzheimer Disease Paternal Grandmother      Diabetes No family hx of      Coronary Artery Disease No family hx of      Hypertension No family hx of      Hyperlipidemia No  family hx of      Cerebrovascular Disease No family hx of      Breast Cancer No family hx of      Colon Cancer No family hx of      Prostate Cancer No family hx of      Other Cancer No family hx of      Depression No family hx of      Anxiety Disorder No family hx of      Mental Illness No family hx of      Substance Abuse No family hx of      Anesthesia Reaction No family hx of      Asthma No family hx of      Osteoporosis No family hx of      Genetic Disorder No family hx of      Obesity No family hx of      Unknown/Adopted No family hx of          No current outpatient prescriptions on file.     Allergies   Allergen Reactions     Sulfa Drugs Rash     Recent Labs   Lab Test  18   1204  11   0955   LDL  66   --    HDL  84   --    TRIG  102   --    ALT  21  11   CR  0.79  0.80   GFRESTIMATED  77  79   GFRESTBLACK  >90  >90   POTASSIUM  4.1  4.0   TSH  2.19   --         Mammogram Screening: Patient over age 50, mutual decision to screen reflected in health maintenance.    Pertinent mammograms are reviewed under the imaging tab.  History of abnormal Pap smear: NO - age 30-65 PAP every 5 years with negative HPV co-testing recommended  Last 3 Pap Results: No results found for: PAP     Reviewed and updated as needed this visit by clinical staff  Tobacco  Allergies  Meds  Problems  Med Hx  Surg Hx  Fam Hx  Soc Hx          Reviewed and updated as needed this visit by Provider  Tobacco  Allergies  Meds  Problems  Med Hx  Surg Hx  Fam Hx  Soc Hx         Past Medical History:   Diagnosis Date     Anemia 1985     Eustachian tube dysfunction      Pleuritis      Rectal bleed 2016     Rectal bleeding 7/15/2016     Right foot pain 2017    xray neg     Right hip pain       Past Surgical History:   Procedure Laterality Date     COLONOSCOPY N/A 2016    Procedure: COLONOSCOPY;  Surgeon: Eber Burns MD;  Location:  GI     Obstetric History       T1      L1      "SAB0   TAB0   Ectopic0   Multiple0   Live Births0       # Outcome Date GA Lbr Jaycob/2nd Weight Sex Delivery Anes PTL Lv   1 Term                   Review of Systems   Constitutional: Negative for chills and fever.   HENT: Negative for congestion, ear pain, hearing loss and sore throat.    Eyes: Negative for pain and visual disturbance.   Respiratory: Negative for cough and shortness of breath.    Cardiovascular: Negative for chest pain, palpitations and peripheral edema.   Gastrointestinal: Positive for abdominal pain, constipation, diarrhea and hematochezia. Negative for heartburn and nausea.   Breasts:  Positive for tenderness. Negative for breast mass and discharge.   Genitourinary: Positive for pelvic pain and vaginal bleeding. Negative for dysuria, frequency, genital sores, hematuria, urgency and vaginal discharge.   Musculoskeletal: Positive for arthralgias and myalgias. Negative for joint swelling.   Skin: Negative for rash.   Neurological: Positive for weakness. Negative for dizziness, headaches and paresthesias.   Psychiatric/Behavioral: Negative for mood changes. The patient is not nervous/anxious.       OBJECTIVE:   /78 (BP Location: Left arm, Patient Position: Chair, Cuff Size: Adult Regular)  Pulse 76  Temp 97.7  F (36.5  C) (Oral)  Resp 13  Ht 5' 5\" (1.651 m)  Wt 149 lb 8 oz (67.8 kg)  LMP 11/25/2018  SpO2 100%  BMI 24.88 kg/m2  Physical Exam  GENERAL: healthy, alert and no distress  EYES: Eyes grossly normal to inspection, PERRL and conjunctivae and sclerae normal  HENT: ear canals and TM's normal, nose and mouth without ulcers or lesions  NECK: no adenopathy, no asymmetry, masses, or scars and thyroid normal to palpation  RESP: lungs clear to auscultation - no rales, rhonchi or wheezes  BREAST: normal without masses, tenderness or nipple discharge and no palpable axillary masses or adenopathy  CV: regular rate and rhythm, normal S1 S2, no S3 or S4, no murmur, click or rub, no peripheral " edema and peripheral pulses strong  ABDOMEN: soft, nontender, no hepatosplenomegaly, no masses and bowel sounds normal  MS: no gross musculoskeletal defects noted, no edema  SKIN: no suspicious lesions or rashes  NEURO: Normal strength and tone, mentation intact and speech normal  PSYCH: mentation appears normal, affect normal/bright    Diagnostic Test Results:  No results found for this or any previous visit (from the past 24 hour(s)).    ASSESSMENT/PLAN:   1. Routine history and physical examination of adult  Breast exam normal. Mammogram starting age 50. Pelvic/pap/ HPV due 4/2021. All diagnostics explained. Is switching to [facility] due to new job so will establish with new PCP and get further referrals as needed from them. Declines flu shot. No indication for HIV testing. See gyn for endometrial thickening and EMB and also can be evaluated for endometriosis as cause for pelvic pain by them See Gi for for gut issues. Not had a long enough trial on low FODMAPs diet but also being vegetarian diet is very restrictive. Can do probiotics. See ortho for right hip pain, may be playing a role in her RLQ pain.   See hematology after that for anemia if cause not determined by GI or gyn. Continue as best she can with dietary and supplemental iron. Currently symptoms do no warrant an iron infusion or transfusion. Hb stable to what she has had in the pats. Recommend rechecking iron, ferritin and cbc in 2 months of increased iron through diet and or supplements     2. Endometrial thickening on ultrasound  - OB/GYN REFERRAL  - OFFICE/OUTPT VISIT,EST,LEVL III    3. Iron deficiency anemia due to chronic blood loss  Longstanding. No worse hb than prior. Possibly form menorrhagia, vegetarianism and some gut malabsorption hard to say. Iron is hard on her digestion and makes constipation worse. See hematology after that for anemia if cause not determined by GI or gyn. Continue as best she can with dietary and supplemental  iron. Currently symptoms do no warrant an iron infusion or transfusion. Hb stable to what she has had in the pats. Recommend rechecking iron, ferritin and cbc in 2 months of increased iron through diet and or supplements   - OB/GYN REFERRAL  - OFFICE/OUTPT VISIT,EST,LEVL III  - ONC/HEME ADULT REFERRAL    4. Irregular menstrual cycle  - OB/GYN REFERRAL  - OFFICE/OUTPT VISIT,EST,LEVL III    5. Vaginal bleeding  May be cause of her anemia. Menstrual cycles irregular with 4 days of moderate bleeding and last up to a week with spotting before and after  - OB/GYN REFERRAL  - OFFICE/OUTPT VISIT,EST,LEVL III    6. Rectal bleeding  BRBPR on ly on wiping, minimal , prior colonoscopy 2016 was unremarkable except for small non bleeding internal hemorrhoids, GI to evaluate   - GASTROENTEROLOGY ADULT REF CONSULT ONLY  - OFFICE/OUTPT VISIT,EST,LEVL III    7. Pelvic pain in female  See gyn for endometriosis eval  - OB/GYN REFERRAL  - GASTROENTEROLOGY ADULT REF CONSULT ONLY  - OFFICE/OUTPT VISIT,EST,LEVL III    8. Abdominal pain, right lower quadrant  Could be form IBS versus right hip arthritis or endometriosis , hard to say . Work up as above. Exam benigna dn labs reassuring and has has no unintentional weight loss.   - OB/GYN REFERRAL  - GASTROENTEROLOGY ADULT REF CONSULT ONLY  - OFFICE/OUTPT VISIT,EST,LEVL III    9. Alternating constipation and diarrhea  Suspect IBS  - GASTROENTEROLOGY ADULT REF CONSULT ONLY  - OFFICE/OUTPT VISIT,EST,LEVL III    10. Vegetarian  May be contributing to anemia.   - ONC/HEME ADULT REFERRAL    11. Arthralgia, unspecified joint  Could be related to anemia  - ORTHO  REFERRAL  - OFFICE/OUTPT VISIT,EST,LEVL III    12. Myalgia  Could be related to anemia  - ORTHO  REFERRAL  - OFFICE/OUTPT VISIT,EST,LEVL III    13. Arthritis of right hip  - ORTHO  REFERRAL  - OFFICE/OUTPT VISIT,EST,LEVL III    14. Weakness generalized  Low energy from anemia. No red flag symptoms or signs.  -  "OFFICE/OUTPT VISIT,ESTAIMEE III    15. Influenza vaccination declined    COUNSELING:  Reviewed preventive health counseling, as reflected in patient instructions       Regular exercise       Healthy diet/nutrition       Vision screening       Immunizations    Declined: Influenza    BP Readings from Last 1 Encounters:   11/26/18 121/78     Estimated body mass index is 24.88 kg/(m^2) as calculated from the following:    Height as of this encounter: 5' 5\" (1.651 m).    Weight as of this encounter: 149 lb 8 oz (67.8 kg).           reports that she quit smoking about 21 years ago. She has never used smokeless tobacco.      Counseling Resources:  ATP IV Guidelines  Pooled Cohorts Equation Calculator  Breast Cancer Risk Calculator  FRAX Risk Assessment  ICSI Preventive Guidelines  Dietary Guidelines for Americans, 2010  USDA's MyPlate  ASA Prophylaxis  Lung CA Screening    Reena Mccoy MD  [facility]  "

## 2018-11-26 NOTE — MR AVS SNAPSHOT
After Visit Summary   11/26/2018    Juana Fischer    MRN: [id number]           Patient Information     Date Of Birth          1970        Visit Information        Provider Department      11/26/2018 1:40 PM Reena Mccoy MD [facility]        Today's Diagnoses     Routine history and physical examination of adult    -  1    Endometrial thickening on ultrasound        Iron deficiency anemia due to chronic blood loss        Irregular menstrual cycle        Vaginal bleeding        Rectal bleeding        Pelvic pain in female        Abdominal pain, right lower quadrant        Alternating constipation and diarrhea        Vegetarian        Arthralgia, unspecified joint        Myalgia        Arthritis of right hip        Weakness generalized        Influenza vaccination declined          Care Instructions    Breast exam normal  mammogram starting age 50  Pelvic/pap/ HPV due 4/2021  See gyn for endometrial thickening  See Gi for for gut issues  See ortho for right hip pain  See hematology after that for anemia  recheck iron, ferritin and cbc in 2 months on increased iron through diet and or supplements     Preventive Health Recommendations  Female Ages 40 to 49    Yearly exam:     See your health care provider every year in order to  1. Review health changes.   2. Discuss preventive care.    3. Review your medicines if your doctor prescribed any.      Get a Pap test every three years (unless you have an abnormal result and your provider advises testing more often).      If you get Pap tests with HPV test, you only need to test every 5 years, unless you have an abnormal result. You do not need a Pap test if your uterus was removed (hysterectomy) and you have not had cancer.      You should be tested each year for STDs (sexually transmitted diseases), if you're at risk.     Ask your doctor if you should have a mammogram.      Have a colonoscopy (test for colon cancer) if someone in  your family has had colon cancer or polyps before age 50.       Have a cholesterol test every 5 years.       Have a diabetes test (fasting glucose) after age 45. If you are at risk for diabetes, you should have this test every 3 years.    Shots: Get a flu shot each year. Get a tetanus shot every 10 years.     Nutrition:     Eat at least 5 servings of fruits and vegetables each day.    Eat whole-grain bread, whole-wheat pasta and brown rice instead of white grains and rice.    Get adequate Calcium and Vitamin D.      Lifestyle    Exercise at least 150 minutes a week (an average of 30 minutes a day, 5 days a week). This will help you control your weight and prevent disease.    Limit alcohol to one drink per day.    No smoking.     Wear sunscreen to prevent skin cancer.    See your dentist every six months for an exam and cleaning.          Follow-ups after your visit        Additional Services     GASTROENTEROLOGY ADULT REF CONSULT ONLY       Preferred Location: [facility] [phone], [facility]: [phone] and [facility] [phone]      Please be aware that coverage of these services is subject to the terms and limitations of your health insurance plan.  Call member services at your health plan with any benefit or coverage questions.  Any procedures must be performed at a [facility] facility OR coordinated by your clinic's referral office.    Please bring the following with you to your appointment:    (1) Any X-Rays, CTs or MRIs which have been performed.  Contact the facility where they were done to arrange for  prior to your scheduled appointment.    (2) List of current medications   (3) This referral request   (4) Any documents/labs given to you for this referral            OB/GYN REFERRAL       Your provider has referred you to:  [facility]: [facility] [phone]   [email or web address]    Please be aware that coverage of these  services is subject to the terms and limitations of your health insurance plan.  Call member services at your health plan with any benefit or coverage questions.      Please bring the following with you to your appointment:    (1) Any X-Rays, CTs or MRIs which have been performed.  Contact the facility where they were done to arrange for  prior to your scheduled appointment.  Any new CT, MRI or other procedures ordered by your specialist must be performed at a [facility] facility or coordinated by your clinic's referral office.    (2) List of current medications   (3) This referral request   (4) Any documents/labs given to you for this referral            ONC/HEME ADULT REFERRAL       Your provider has referred you to: [facility]: Cancer Care/Hematology (All Cancer Related Services) [facility] [phone]   [email or web address]  [place] [phone]   [email or web address]    Please be aware that coverage of these services is subject to the terms and limitations of your health insurance plan.  Call member services at your health plan with any benefit or coverage questions.      Please bring the following with you to your appointment:    (1) Any X-Rays, CTs or MRIs which have been performed.  Contact the facility where they were done to arrange for  prior to your scheduled appointment.   (2) List of current medications  (3) This referral request   (4) Any documents/labs given to you for this referral            ORTHO  REFERRAL       [facility] is referring you to the Orthopedic  Services at [facility].       The  Representative will assist you in the coordination of your Orthopedic and Musculoskeletal Care as prescribed by your physician.    The  Representative will call you within 1 business day to help schedule your appointment, or you  may contact the [facility] Representative at:    All areas ~ [phone]     Type of Referral : Non Surgical / Sport Medicine       Timeframe requested: 3 - 5 days    Coverage of these services is subject to the terms and limitations of your health insurance plan.  Please call member services at your health plan with any benefit or coverage questions.      If X-rays, CT or MRI's have been performed, please contact the facility where they were done to arrange for , prior to your scheduled appointment.  Please bring this referral request to your appointment and present it to your specialist.                  Follow-up notes from your care team     Return in about 2 months (around 1/26/2019) for Routine Visit for chronic issues with PCP.      Who to contact     If you have questions or need follow up information about today's clinic visit or your schedule please contact [facility] directly at [phone].  Normal or non-critical lab and imaging results will be communicated to you by MyChart, letter or phone within 4 business days after the clinic has received the results. If you do not hear from us within 7 days, please contact the clinic through [employer] or phone. If you have a critical or abnormal lab result, we will notify you by phone as soon as possible.  Submit refill requests through [employer] or call your pharmacy and they will forward the refill request to us. Please allow 3 business days for your refill to be completed.          Additional Information About Your Visit        [employer] Information     [employer] gives you secure access to your electronic health record. If you see a primary care provider, you can also send messages to your care team and make appointments. If you have questions, please call your primary care clinic.  If you do not have a primary care provider, please call [phone] and they will assist you.        Care EveryWhere ID     This is your Care EveryWhere ID. This  "could be used by other organizations to access your [facility] medical records  [id number]        Your Vitals Were     Pulse Temperature Respirations Height Last Period Pulse Oximetry    76 97.7  F (36.5  C) (Oral) 13 5' 5\" (1.651 m) 11/25/2018 100%    BMI (Body Mass Index)                   24.88 kg/m2            Blood Pressure from Last 3 Encounters:   11/26/18 121/78   11/06/18 113/68   07/26/16 127/74    Weight from Last 3 Encounters:   11/26/18 149 lb 8 oz (67.8 kg)   11/06/18 149 lb 4 oz (67.7 kg)   07/26/16 150 lb (68 kg)              We Performed the Following     GASTROENTEROLOGY ADULT REF CONSULT ONLY     OB/GYN REFERRAL     OFFICE/OUTPT VISIT,EST,LEVL III     ONC/HEME ADULT REFERRAL     ORTHO  REFERRAL        Primary Care Provider Office Phone # Fax #    Reena Mccoy [phone] [address] [address]        Equal Access to Services     JACKI NASCIMENTO : Hadii aad ku hadasho Soomaali, waaxda luqadaha, qaybta kaalmada adeegyada, waxay nishain tiffany rashid . So [facility] [phone].    ATENCIÓN: Si habla español, tiene a rao disposición servicios gratuitos de asistencia lingüística. [facility] [phone].    We comply with applicable federal civil rights laws and [place] laws. We do not discriminate on the basis of race, color, national origin, age, disability, sex, sexual orientation, or gender identity.            Thank you!     Thank you for choosing [facility]  for your care. Our goal is always to provide you with excellent care. Hearing back from our patients is one way we can continue to improve our services. Please take a few minutes to complete the written survey that you may receive in the mail after your visit with us. Thank you!             Your Updated Medication List - Protect others around you: Learn how to safely use, store and throw away your medicines at www.disposemymeds.org.      Notice  As of 11/26/2018  2:24 PM    You " have not been prescribed any medications.

## 2020-03-01 ENCOUNTER — HEALTH MAINTENANCE LETTER (OUTPATIENT)
Age: 50
End: 2020-03-01

## 2020-12-14 ENCOUNTER — HEALTH MAINTENANCE LETTER (OUTPATIENT)
Age: 50
End: 2020-12-14

## 2021-03-10 ENCOUNTER — OFFICE VISIT (OUTPATIENT)
Dept: OBGYN | Facility: CLINIC | Age: 51
End: 2021-03-10
Attending: OBSTETRICS & GYNECOLOGY
Payer: COMMERCIAL

## 2021-03-10 VITALS
WEIGHT: 154.7 LBS | HEIGHT: 66 IN | BODY MASS INDEX: 24.86 KG/M2 | HEART RATE: 76 BPM | DIASTOLIC BLOOD PRESSURE: 81 MMHG | SYSTOLIC BLOOD PRESSURE: 115 MMHG

## 2021-03-10 DIAGNOSIS — Z11.3 ROUTINE SCREENING FOR STI (SEXUALLY TRANSMITTED INFECTION): ICD-10-CM

## 2021-03-10 DIAGNOSIS — Z00.00 VISIT FOR PREVENTIVE HEALTH EXAMINATION: Primary | ICD-10-CM

## 2021-03-10 LAB — T PALLIDUM AB SER QL: NONREACTIVE

## 2021-03-10 PROCEDURE — 36415 COLL VENOUS BLD VENIPUNCTURE: CPT | Performed by: OBSTETRICS & GYNECOLOGY

## 2021-03-10 PROCEDURE — 87591 N.GONORRHOEAE DNA AMP PROB: CPT | Performed by: OBSTETRICS & GYNECOLOGY

## 2021-03-10 PROCEDURE — 87491 CHLMYD TRACH DNA AMP PROBE: CPT | Performed by: OBSTETRICS & GYNECOLOGY

## 2021-03-10 PROCEDURE — G0463 HOSPITAL OUTPT CLINIC VISIT: HCPCS | Mod: 25

## 2021-03-10 PROCEDURE — 99386 PREV VISIT NEW AGE 40-64: CPT | Mod: GE | Performed by: OBSTETRICS & GYNECOLOGY

## 2021-03-10 PROCEDURE — 87389 HIV-1 AG W/HIV-1&-2 AB AG IA: CPT | Performed by: OBSTETRICS & GYNECOLOGY

## 2021-03-10 PROCEDURE — 86780 TREPONEMA PALLIDUM: CPT | Performed by: OBSTETRICS & GYNECOLOGY

## 2021-03-10 RX ORDER — MULTIPLE VITAMINS W/ MINERALS TAB 9MG-400MCG
1 TAB ORAL DAILY
COMMUNITY
End: 2022-06-23

## 2021-03-10 SDOH — HEALTH STABILITY: MENTAL HEALTH: HOW MANY STANDARD DRINKS CONTAINING ALCOHOL DO YOU HAVE ON A TYPICAL DAY?: 1 OR 2

## 2021-03-10 SDOH — HEALTH STABILITY: MENTAL HEALTH: HOW OFTEN DO YOU HAVE 6 OR MORE DRINKS ON ONE OCCASION?: NEVER

## 2021-03-10 SDOH — HEALTH STABILITY: MENTAL HEALTH: HOW OFTEN DO YOU HAVE A DRINK CONTAINING ALCOHOL?: MONTHLY OR LESS

## 2021-03-10 ASSESSMENT — ANXIETY QUESTIONNAIRES
1. FEELING NERVOUS, ANXIOUS, OR ON EDGE: SEVERAL DAYS
6. BECOMING EASILY ANNOYED OR IRRITABLE: NOT AT ALL
3. WORRYING TOO MUCH ABOUT DIFFERENT THINGS: NOT AT ALL
GAD7 TOTAL SCORE: 2
7. FEELING AFRAID AS IF SOMETHING AWFUL MIGHT HAPPEN: NOT AT ALL
2. NOT BEING ABLE TO STOP OR CONTROL WORRYING: SEVERAL DAYS
5. BEING SO RESTLESS THAT IT IS HARD TO SIT STILL: NOT AT ALL

## 2021-03-10 ASSESSMENT — MIFFLIN-ST. JEOR: SCORE: 1333.46

## 2021-03-10 ASSESSMENT — PAIN SCALES - GENERAL: PAINLEVEL: NO PAIN (0)

## 2021-03-10 ASSESSMENT — PATIENT HEALTH QUESTIONNAIRE - PHQ9
SUM OF ALL RESPONSES TO PHQ QUESTIONS 1-9: 0
5. POOR APPETITE OR OVEREATING: NOT AT ALL

## 2021-03-10 NOTE — PATIENT INSTRUCTIONS

## 2021-03-10 NOTE — PROGRESS NOTES
[facility]  Gynecology Visit    CC: establish care     HPI:    Juana Fischer is a 50 year old , here to establish care. Juana says that she has been doing well, no specific complaints. She says that she is overall healthy. She is starting to get irregular periods, thinks she is nearing menopause. She would like to be tested for STIs. No other requests today.     Obstetrics History:  OB History    Para Term  AB Living   2 1 1 0 1 1   SAB TAB Ectopic Multiple Live Births   1 0 0 0 1      # Outcome Date GA Lbr Jaycob/2nd Weight Sex Delivery Anes PTL Lv   2 Term 04 40w0d  3.43 kg (7 lb 9 oz) F Vag-Spont   FERNANDEZ      Name: Carolina   2004               Gynecologic History:  - LMP: Patient's last menstrual period was 2021.  - Last Pap: NILM, HPV: negative (2018)  - Denies any history of abnormal pap smears  - Denies prior cervical surgery or procedures  - Denies any history of frequent UTIs, vaginal infections, or STIs  - Menses: cycles irregular can be every few months, just had two that were two months apart. Overall light flow, tolerable cramps   - Contraception: condoms  - Sexual Activity: two male partners over past year    Past Medical History:  Denies    Past Surgical History:  Past Surgical History:   Procedure Laterality Date     COLONOSCOPY N/A 2016    Procedure: COLONOSCOPY;  Surgeon: Eber Burns MD;  [facility]       Current Medications:  Multivitamin     Allergies:  Sulfa drugs    Social History:   Recently   Occasional alcohol use   Denies tobacco or drug use    Family History:  Family History   Problem Relation Age of Onset     Cancer Father         lung CA, smoker     Alcoholism Father      Alcoholism Mother      Thyroid Disease Mother      Diverticulitis Mother      Cancer Maternal Grandmother         Breast CA     Alzheimer Disease Paternal Grandmother      Diabetes No family hx of      Coronary Artery Disease No family hx of   "    Hypertension No family hx of      Hyperlipidemia No family hx of      Cerebrovascular Disease No family hx of      Breast Cancer No family hx of      Colon Cancer No family hx of      Prostate Cancer No family hx of      Other Cancer No family hx of      Depression No family hx of      Anxiety Disorder No family hx of      Mental Illness No family hx of      Substance Abuse No family hx of      Anesthesia Reaction No family hx of      Asthma No family hx of      Osteoporosis No family hx of      Genetic Disorder No family hx of      Obesity No family hx of      Unknown/Adopted No family hx of      ROS:  10-point ROS negative except as in HPI     Physical Exam  /81   Pulse 76   Ht 1.676 m (5' 6\")   Wt 70.2 kg (154 lb 11.2 oz)   LMP 2021   BMI 24.97 kg/m    Gen: Well-appearing, NAD  HEENT: Normocephalic, atraumatic  CV:  RRR, no m/r/g auscultated  Pulm: CTAB, no w/r/r auscultated  Abd: Soft, non-tender, non-distended  Ext: No LE edema, extremities warm and well perfused    Breast Exam:  Breast: Without visible skin changes. No dimpling or lesions seen.   Breasts supple, non-tender with palpation, no dominant mass, nodularity, or nipple discharge noted bilaterally. Axillary nodes negative.      Pelvic Exam:  EG/BUS: Normal genital architecture without lesions, erythema or abnormal secretions Bartholin's, Urethra, [place]'s normal   Urethral meatus: normal   Urethra: no masses, tenderness, or scarring   Bladder: no masses or tenderness   Vagina: moist, pink, rugae with creamy, white and odorless  secretions  Cervix: no lesions  Uterus: anteverted,  and small, smooth, firm, mobile w/o pain  Adnexa: Within normal limits and No masses, nodularity, tenderness  Rectum:anus normal     I have reviewed labs and imaging    Assessment/Plan  Juana Fischer is a 50 year old  female here to establish care. Patient is overall doing well, with no specific complaints.     # Preventative health  - " Cervical cancer screening- NILM, HPV: negative (4/2018), next due 4/2023  - Breast cancer screening- Declines mammogram at this time. Concerned about false positives. Discussed that mammograms are important screening tool to detect breast cancer. Patient will think about it and call if she would like mammogram ordered   - Colon cancer screening- up to date with colonoscopy  - STI testing: Gc/Ch, RPR, HIV. Okay with results via Mychart  - Additional teaching done at this visit regarding self breast exam, exercise, birth control, perimenopause, mental health and weight/diet.    Patient staffed with Dr. Vasyl Khan MD  OB/GYN Resident, PGY-4  3/10/2021, 9:55 AM    The Patient was seen in Resident Continuity Clinic by DARIUS KHAN.  I reviewed the history & exam. Assessment and plan were jointly made.    Lexy Fallon MD

## 2021-03-10 NOTE — LETTER
3/10/2021       RE: Juana Fischer  [address]  [place] [address]     Dear Colleague,    Thank you for referring your patient, Juana Fischer, to the [facility] [place] at [facility]. Please see a copy of my visit note below.    [facility]  Gynecology Visit    CC: establish care     HPI:    Juana Fischer is a 50 year old , here to establish care. Juana says that she has been doing well, no specific complaints. She says that she is overall healthy. She is starting to get irregular periods, thinks she is nearing menopause. She would like to be tested for STIs. No other requests today.     Obstetrics History:  OB History    Para Term  AB Living   2 1 1 0 1 1   SAB TAB Ectopic Multiple Live Births   1 0 0 0 1      # Outcome Date GA Lbr Jaycob/2nd Weight Sex Delivery Anes PTL Lv   2 Term 04 40w0d  3.43 kg (7 lb 9 oz) F Vag-Spont   FERNANDEZ      Name: Carolina   2004               Gynecologic History:  - LMP: Patient's last menstrual period was 2021.  - Last Pap: NILM, HPV: negative (2018)  - Denies any history of abnormal pap smears  - Denies prior cervical surgery or procedures  - Denies any history of frequent UTIs, vaginal infections, or STIs  - Menses: cycles irregular can be every few months, just had two that were two months apart. Overall light flow, tolerable cramps   - Contraception: condoms  - Sexual Activity: two male partners over past year    Past Medical History:  Denies    Past Surgical History:  Past Surgical History:   Procedure Laterality Date     COLONOSCOPY N/A 2016    Procedure: COLONOSCOPY;  Surgeon: Eber Burns MD;  Location:  GI       Current Medications:  Multivitamin     Allergies:  Sulfa drugs    Social History:   Recently   Occasional alcohol use   Denies tobacco or drug use    Family History:  Family History   Problem Relation  "Age of Onset     Cancer Father         lung CA, smoker     Alcoholism Father      Alcoholism Mother      Thyroid Disease Mother      Diverticulitis Mother      Cancer Maternal Grandmother         Breast CA     Alzheimer Disease Paternal Grandmother      Diabetes No family hx of      Coronary Artery Disease No family hx of      Hypertension No family hx of      Hyperlipidemia No family hx of      Cerebrovascular Disease No family hx of      Breast Cancer No family hx of      Colon Cancer No family hx of      Prostate Cancer No family hx of      Other Cancer No family hx of      Depression No family hx of      Anxiety Disorder No family hx of      Mental Illness No family hx of      Substance Abuse No family hx of      Anesthesia Reaction No family hx of      Asthma No family hx of      Osteoporosis No family hx of      Genetic Disorder No family hx of      Obesity No family hx of      Unknown/Adopted No family hx of      ROS:  10-point ROS negative except as in HPI     Physical Exam  /81   Pulse 76   Ht 1.676 m (5' 6\")   Wt 70.2 kg (154 lb 11.2 oz)   LMP 02/25/2021   BMI 24.97 kg/m    Gen: Well-appearing, NAD  HEENT: Normocephalic, atraumatic  CV:  RRR, no m/r/g auscultated  Pulm: CTAB, no w/r/r auscultated  Abd: Soft, non-tender, non-distended  Ext: No LE edema, extremities warm and well perfused    Breast Exam:  Breast: Without visible skin changes. No dimpling or lesions seen.   Breasts supple, non-tender with palpation, no dominant mass, nodularity, or nipple discharge noted bilaterally. Axillary nodes negative.      Pelvic Exam:  EG/BUS: Normal genital architecture without lesions, erythema or abnormal secretions Bartholin's, Urethra, [place]'s normal   Urethral meatus: normal   Urethra: no masses, tenderness, or scarring   Bladder: no masses or tenderness   Vagina: moist, pink, rugae with creamy, white and odorless  secretions  Cervix: no lesions  Uterus: anteverted,  and small, smooth, firm, mobile " w/o pain  Adnexa: Within normal limits and No masses, nodularity, tenderness  Rectum:anus normal     I have reviewed labs and imaging    Assessment/Plan  Juana Fischer is a 50 year old  female here to establish care. Patient is overall doing well, with no specific complaints.     # Preventative health  - Cervical cancer screening- NILM, HPV: negative (2018), next due 2023  - Breast cancer screening- Declines mammogram at this time. Concerned about false positives. Discussed that mammograms are important screening tool to detect breast cancer. Patient will think about it and call if she would like mammogram ordered   - Colon cancer screening- up to date with colonoscopy  - STI testing: Gc/Ch, RPR, HIV. Okay with results via [facility]  - Additional teaching done at this visit regarding self breast exam, exercise, birth control, perimenopause, mental health and weight/diet.    Patient staffed with Dr. Vasyl Khan MD  OB/GYN Resident, PGY-4  3/10/2021, 9:55 AM

## 2021-03-11 LAB
C TRACH DNA SPEC QL NAA+PROBE: NEGATIVE
HIV 1+2 AB+HIV1 P24 AG SERPL QL IA: NONREACTIVE
N GONORRHOEA DNA SPEC QL NAA+PROBE: NEGATIVE
SPECIMEN SOURCE: NORMAL
SPECIMEN SOURCE: NORMAL

## 2021-03-11 ASSESSMENT — ANXIETY QUESTIONNAIRES: GAD7 TOTAL SCORE: 2

## 2021-04-17 ENCOUNTER — HEALTH MAINTENANCE LETTER (OUTPATIENT)
Age: 51
End: 2021-04-17

## 2021-08-27 ENCOUNTER — OFFICE VISIT (OUTPATIENT)
Dept: DERMATOLOGY | Facility: CLINIC | Age: 51
End: 2021-08-27
Payer: COMMERCIAL

## 2021-08-27 VITALS — DIASTOLIC BLOOD PRESSURE: 81 MMHG | SYSTOLIC BLOOD PRESSURE: 123 MMHG | OXYGEN SATURATION: 99 % | HEART RATE: 59 BPM

## 2021-08-27 DIAGNOSIS — D22.9 NEVUS: Primary | ICD-10-CM

## 2021-08-27 DIAGNOSIS — L82.1 SEBORRHEIC KERATOSIS: ICD-10-CM

## 2021-08-27 DIAGNOSIS — D18.01 ANGIOMA OF SKIN: ICD-10-CM

## 2021-08-27 DIAGNOSIS — L81.4 LENTIGO: ICD-10-CM

## 2021-08-27 PROCEDURE — 99203 OFFICE O/P NEW LOW 30 MIN: CPT | Performed by: PHYSICIAN ASSISTANT

## 2021-08-27 NOTE — LETTER
8/27/2021         RE: Juana Fischer  [address]  [place] [address]        Dear Colleague,    Thank you for referring your patient, Juana Fischer, to the [facility]. Please see a copy of my visit note below.    HPI:   Chief complaints: Juana Fischer is a pleasant 50 year old female who presents for Full skin cancer screening to rule out skin cancer   Last Skin Exam: n/a      1st Baseline: yes  Personal HX of Skin Cancer: no   Personal HX of Malignant Melanoma: no   Family HX of Skin Cancer / Malignant Melanoma: no  Personal HX of Atypical Moles:   no  Risk factors: history of sun exposure and burns  New / Changing lesions:yes few areas  Social History: lives in [place] by the [employer]. Works in [employer]  On review of systems, there are no further skin complaints, patient is feeling otherwise well.   ROS of the following were done and are negative: Constitutional, Eyes, Ears, Nose,   Mouth, Throat, Cardiovascular, Respiratory, GI, Genitourinary, Musculoskeletal,   Psychiatric, Endocrine, Allergic/Immunologic.    PHYSICAL EXAM:   /81   Pulse 59   SpO2 99%   Breastfeeding No   Skin exam performed as follows: Type 2 skin. Mood appropriate  Alert and Oriented X 3. Well developed, well nourished in no distress.  General appearance: Normal  Head including face: Normal  Eyes: conjunctiva and lids: Normal  Mouth: Lips, teeth, gums: Normal  Neck: Normal  Chest-breast/axillae: Normal  Back: Normal  Spleen and liver: Normal  Cardiovascular: Exam of peripheral vascular system by observation for swelling, varicosities, edema: Normal  Genitalia: groin, buttocks: Normal  Extremities: digits/nails (clubbing): Normal  Eccrine and Apocrine glands: Normal  Right upper extremity: Normal  Left upper extremity: Normal  Right lower extremity: Normal  Left lower extremity: Normal  Skin: Scalp and body hair: See below    Pt deferred exam of breasts, groin,  buttocks: No    Other physical findings:  1. Multiple pigmented macules on extremities and trunk  2. Multiple pigmented macules on face, trunk and extremities  3. Multiple vascular papules on trunk, arms and legs  4. Multiple scattered keratotic plaques       Except as noted above, no other signs of skin cancer or melanoma.     ASSESSMENT/PLAN:   Benign Full skin cancer screening today. . Patient with history of none  Advised on monthly self exams and 1 year  Patient Education: Appropriate brochures given.    1. Multiple benign appearing nevi on arms, legs and trunk. Discussed ABCDEs of melanoma and sunscreen.   2. Multiple lentigos on arms, legs and trunk. Advised benign, no treatment needed.  3. Multiple scattered angiomas. Advised benign, no treatment needed.   4. Seborrheic keratosis on arms, legs and trunk. Advised benign, no treatment needed.              Follow-up: FSE every 1-2 years/PRN sooner    1.) Patient was asked about new and changing moles. YES  2.) Patient received a complete physical skin examination: YES  3.) Patient was counseled to perform a monthly self skin examination: YES  Scribed By: Susy Canas, MS, PA-C          Again, thank you for allowing me to participate in the care of your patient.        Sincerely,        Susy Canas PA-C

## 2021-08-27 NOTE — PROGRESS NOTES
HPI:   Chief complaints: Juana Fischer is a pleasant 50 year old female who presents for Full skin cancer screening to rule out skin cancer   Last Skin Exam: n/a      1st Baseline: yes  Personal HX of Skin Cancer: no   Personal HX of Malignant Melanoma: no   Family HX of Skin Cancer / Malignant Melanoma: no  Personal HX of Atypical Moles:   no  Risk factors: history of sun exposure and burns  New / Changing lesions:yes few areas  Social History: lives in [facility] by the [facility]. Works in accounting  On review of systems, there are no further skin complaints, patient is feeling otherwise well.   ROS of the following were done and are negative: Constitutional, Eyes, Ears, Nose,   Mouth, Throat, Cardiovascular, Respiratory, GI, Genitourinary, Musculoskeletal,   Psychiatric, Endocrine, Allergic/Immunologic.    PHYSICAL EXAM:   /81   Pulse 59   SpO2 99%   Breastfeeding No   Skin exam performed as follows: Type 2 skin. Mood appropriate  Alert and Oriented X 3. Well developed, well nourished in no distress.  General appearance: Normal  Head including face: Normal  Eyes: conjunctiva and lids: Normal  Mouth: Lips, teeth, gums: Normal  Neck: Normal  Chest-breast/axillae: Normal  Back: Normal  Spleen and liver: Normal  Cardiovascular: Exam of peripheral vascular system by observation for swelling, varicosities, edema: Normal  Genitalia: groin, buttocks: Normal  Extremities: digits/nails (clubbing): Normal  Eccrine and Apocrine glands: Normal  Right upper extremity: Normal  Left upper extremity: Normal  Right lower extremity: Normal  Left lower extremity: Normal  Skin: Scalp and body hair: See below    Pt deferred exam of breasts, groin, buttocks: No    Other physical findings:  1. Multiple pigmented macules on extremities and trunk  2. Multiple pigmented macules on face, trunk and extremities  3. Multiple vascular papules on trunk, arms and legs  4. Multiple scattered keratotic plaques       Except as noted  above, no other signs of skin cancer or melanoma.     ASSESSMENT/PLAN:   Benign Full skin cancer screening today. . Patient with history of none  Advised on monthly self exams and 1 year  Patient Education: Appropriate brochures given.    1. Multiple benign appearing nevi on arms, legs and trunk. Discussed ABCDEs of melanoma and sunscreen.   2. Multiple lentigos on arms, legs and trunk. Advised benign, no treatment needed.  3. Multiple scattered angiomas. Advised benign, no treatment needed.   4. Seborrheic keratosis on arms, legs and trunk. Advised benign, no treatment needed.              Follow-up: FSE every 1-2 years/PRN sooner    1.) Patient was asked about new and changing moles. YES  2.) Patient received a complete physical skin examination: YES  3.) Patient was counseled to perform a monthly self skin examination: YES  Scribed By: Susy Canas MS, PAKristiC

## 2021-10-02 ENCOUNTER — HEALTH MAINTENANCE LETTER (OUTPATIENT)
Age: 51
End: 2021-10-02

## 2022-05-09 ENCOUNTER — LAB REQUISITION (OUTPATIENT)
Dept: LAB | Facility: CLINIC | Age: 52
End: 2022-05-09

## 2022-05-09 DIAGNOSIS — R10.31 RIGHT LOWER QUADRANT PAIN: ICD-10-CM

## 2022-05-09 PROCEDURE — 87086 URINE CULTURE/COLONY COUNT: CPT | Performed by: PHYSICIAN ASSISTANT

## 2022-05-12 LAB — BACTERIA UR CULT: NORMAL

## 2022-05-14 ENCOUNTER — OFFICE VISIT (OUTPATIENT)
Dept: URGENT CARE | Facility: URGENT CARE | Age: 52
End: 2022-05-14
Payer: COMMERCIAL

## 2022-05-14 ENCOUNTER — HEALTH MAINTENANCE LETTER (OUTPATIENT)
Age: 52
End: 2022-05-14

## 2022-05-14 VITALS
HEART RATE: 75 BPM | BODY MASS INDEX: 24.86 KG/M2 | SYSTOLIC BLOOD PRESSURE: 133 MMHG | WEIGHT: 154 LBS | TEMPERATURE: 98.1 F | DIASTOLIC BLOOD PRESSURE: 83 MMHG | OXYGEN SATURATION: 99 %

## 2022-05-14 DIAGNOSIS — R10.31 RLQ ABDOMINAL PAIN: Primary | ICD-10-CM

## 2022-05-14 LAB
ALBUMIN UR-MCNC: NEGATIVE MG/DL
APPEARANCE UR: CLEAR
BACTERIA #/AREA URNS HPF: ABNORMAL /HPF
BASOPHILS # BLD AUTO: 0.1 10E3/UL (ref 0–0.2)
BASOPHILS NFR BLD AUTO: 1 %
BILIRUB UR QL STRIP: NEGATIVE
COLOR UR AUTO: YELLOW
EOSINOPHIL # BLD AUTO: 0 10E3/UL (ref 0–0.7)
EOSINOPHIL NFR BLD AUTO: 1 %
ERYTHROCYTE [DISTWIDTH] IN BLOOD BY AUTOMATED COUNT: 12.1 % (ref 10–15)
GLUCOSE UR STRIP-MCNC: NEGATIVE MG/DL
HCG UR QL: NEGATIVE
HCT VFR BLD AUTO: 42.7 % (ref 35–47)
HGB BLD-MCNC: 14.4 G/DL (ref 11.7–15.7)
HGB UR QL STRIP: ABNORMAL
IMM GRANULOCYTES # BLD: 0 10E3/UL
IMM GRANULOCYTES NFR BLD: 0 %
KETONES UR STRIP-MCNC: NEGATIVE MG/DL
LEUKOCYTE ESTERASE UR QL STRIP: ABNORMAL
LYMPHOCYTES # BLD AUTO: 1.5 10E3/UL (ref 0.8–5.3)
LYMPHOCYTES NFR BLD AUTO: 29 %
MCH RBC QN AUTO: 29.5 PG (ref 26.5–33)
MCHC RBC AUTO-ENTMCNC: 33.7 G/DL (ref 31.5–36.5)
MCV RBC AUTO: 88 FL (ref 78–100)
MONOCYTES # BLD AUTO: 0.3 10E3/UL (ref 0–1.3)
MONOCYTES NFR BLD AUTO: 6 %
NEUTROPHILS # BLD AUTO: 3.4 10E3/UL (ref 1.6–8.3)
NEUTROPHILS NFR BLD AUTO: 63 %
NITRATE UR QL: NEGATIVE
PH UR STRIP: 6 [PH] (ref 5–7)
PLATELET # BLD AUTO: 265 10E3/UL (ref 150–450)
RBC # BLD AUTO: 4.88 10E6/UL (ref 3.8–5.2)
RBC #/AREA URNS AUTO: ABNORMAL /HPF
SP GR UR STRIP: 1.02 (ref 1–1.03)
SQUAMOUS #/AREA URNS AUTO: ABNORMAL /LPF
UROBILINOGEN UR STRIP-ACNC: 0.2 E.U./DL
WBC # BLD AUTO: 5.3 10E3/UL (ref 4–11)
WBC #/AREA URNS AUTO: ABNORMAL /HPF

## 2022-05-14 PROCEDURE — 36415 COLL VENOUS BLD VENIPUNCTURE: CPT | Performed by: INTERNAL MEDICINE

## 2022-05-14 PROCEDURE — 85025 COMPLETE CBC W/AUTO DIFF WBC: CPT | Performed by: INTERNAL MEDICINE

## 2022-05-14 PROCEDURE — 81025 URINE PREGNANCY TEST: CPT | Performed by: INTERNAL MEDICINE

## 2022-05-14 PROCEDURE — 81001 URINALYSIS AUTO W/SCOPE: CPT | Performed by: INTERNAL MEDICINE

## 2022-05-14 PROCEDURE — 99203 OFFICE O/P NEW LOW 30 MIN: CPT | Performed by: INTERNAL MEDICINE

## 2022-05-14 RX ORDER — HYDROCODONE BITARTRATE AND ACETAMINOPHEN 5; 325 MG/1; MG/1
1 TABLET ORAL EVERY 6 HOURS PRN
Qty: 10 TABLET | Refills: 0 | Status: SHIPPED | OUTPATIENT
Start: 2022-05-14 | End: 2022-05-17

## 2022-05-14 NOTE — PROGRESS NOTES
"  Assessment & Plan     RLQ abdominal pain  Differential diagnosis considered includes appendicitis, right ovarian pathology, renal lithiasis, UTI.  The history is most supportive of a right ovarian cyst.  While she does have peritoneal findings, she is not acute in terms of either symptoms or laboratory findings.  Will proceed to outpatient pelvic ultrasound for diagnostic work-up.  Close clinic follow-up.    - UA macro with reflex to Microscopic and Culture - Clinc Collect  - HCG qualitative urine; Future  - CBC with platelets and differential; Future  - HCG qualitative urine  - CBC with platelets and differential  - Urine Microscopic  - US Pelvic Complete with Transvaginal; Future  - HYDROcodone-acetaminophen (NORCO) 5-325 MG tablet; Take 1 tablet by mouth every 6 hours as needed for severe pain    Efe Irizarry MD  [facility]    Subjective     HPI   One week of abdominal pain.  Was initially a mild RLQ pain.  Seen earlier in the week and had a normal CBC and UA.  Then started having some spotting over the past couple of days.  Now having more sharp pain in the right lower abdomen and unable to do certain movements. Right hip flexor movements are particularly painful. She states that her menses have been \"really irregular\" for the past two years and her LMP was July 2021.  She is sexually active. Denies any prior abdominal surgeries.      PMH: Denies chronic medical conditions; some joint problems which she is being monitored for    CMED: no prescription meds; occasional OTC ibuprofen; vitamin B and D supplementation    PSH: no abdominal surgeries    Review of Systems   Constitutional, HEENT, cardiovascular, pulmonary, gi and gu systems are negative, except as otherwise noted.      Objective    /83   Pulse 75   Temp 98.1  F (36.7  C) (Tympanic)   Wt 69.9 kg (154 lb)   SpO2 99%   BMI 24.86 kg/m    Body mass index is 24.86 kg/m .  Physical Exam   GENERAL APPEARANCE: " healthy, alert and no distress  HENT: nose and mouth without ulcers or lesions  NECK: no adenopathy  RESP: lungs clear to auscultation - no rales, rhonchi or wheezes  CV: regular rates and rhythm, normal S1 S2, no S3 or S4 and no murmur, click or rub  ABDOMEN: RLQ tenderness with rebound, positive psoas sign on the right; no CVA tenderness ; bowel sounds present; no masses or organomegaly  BACK: no CVA tenderness     Results for orders placed or performed in visit on 05/14/22 (from the past 24 hour(s))   UA macro with reflex to Microscopic and Culture - Clinc Collect    Specimen: Urine, Clean Catch   Result Value Ref Range    Color Urine Yellow Colorless, Straw, Light Yellow, Yellow    Appearance Urine Clear Clear    Glucose Urine Negative Negative mg/dL    Bilirubin Urine Negative Negative    Ketones Urine Negative Negative mg/dL    Specific Gravity Urine 1.025 1.003 - 1.035    Blood Urine Moderate (A) Negative    pH Urine 6.0 5.0 - 7.0    Protein Albumin Urine Negative Negative mg/dL    Urobilinogen Urine 0.2 0.2, 1.0 E.U./dL    Nitrite Urine Negative Negative    Leukocyte Esterase Urine Trace (A) Negative   Urine Microscopic   Result Value Ref Range    Bacteria Urine None Seen None Seen /HPF    RBC Urine 0-2 0-2 /HPF /HPF    WBC Urine 0-5 0-5 /HPF /HPF    Squamous Epithelials Urine Moderate (A) None Seen /LPF    Narrative    Urine Culture not indicated   HCG qualitative urine   Result Value Ref Range    hCG Urine Qualitative Negative Negative   CBC with platelets and differential    Narrative    The following orders were created for panel order CBC with platelets and differential.  Procedure                               Abnormality         Status                     ---------                               -----------         ------                     CBC with platelets and d...[[id number]]                      Final result                 Please view results for these tests on the individual orders.   CBC with  platelets and differential   Result Value Ref Range    WBC Count 5.3 4.0 - 11.0 10e3/uL    RBC Count 4.88 3.80 - 5.20 10e6/uL    Hemoglobin 14.4 11.7 - 15.7 g/dL    Hematocrit 42.7 35.0 - 47.0 %    MCV 88 78 - 100 fL    MCH 29.5 26.5 - 33.0 pg    MCHC 33.7 31.5 - 36.5 g/dL    RDW 12.1 10.0 - 15.0 %    Platelet Count 265 150 - 450 10e3/uL    % Neutrophils 63 %    % Lymphocytes 29 %    % Monocytes 6 %    % Eosinophils 1 %    % Basophils 1 %    % Immature Granulocytes 0 %    Absolute Neutrophils 3.4 1.6 - 8.3 10e3/uL    Absolute Lymphocytes 1.5 0.8 - 5.3 10e3/uL    Absolute Monocytes 0.3 0.0 - 1.3 10e3/uL    Absolute Eosinophils 0.0 0.0 - 0.7 10e3/uL    Absolute Basophils 0.1 0.0 - 0.2 10e3/uL    Absolute Immature Granulocytes 0.0 <=0.4 10e3/uL

## 2022-05-14 NOTE — PATIENT INSTRUCTIONS
Use ibuprofen 600 mg (3 tablets) three times daily with food.  Can use the hydrocodone as a fall-back position for more severe pain.    The hunch here is an ovarian cyst -- pelvic ultrasound should be diagnostic for that.  Generally speaking, the body will resolve this and frequently pain relief and monitoring is the primary treatment approach.  Occasionally, things get more acute.  You'll know that because you'll start having more pain or having new symptoms.  Fevers and whole body symptoms would be things to get assessed right away.    Follow-up later in the week with one of our primary care MDs.

## 2022-05-16 ENCOUNTER — HOSPITAL ENCOUNTER (OUTPATIENT)
Dept: ULTRASOUND IMAGING | Facility: CLINIC | Age: 52
Discharge: HOME | End: 2022-05-16
Attending: INTERNAL MEDICINE | Admitting: INTERNAL MEDICINE
Payer: COMMERCIAL

## 2022-05-16 DIAGNOSIS — R10.31 RLQ ABDOMINAL PAIN: ICD-10-CM

## 2022-05-16 PROCEDURE — 76830 TRANSVAGINAL US NON-OB: CPT

## 2022-05-16 PROCEDURE — 76830 TRANSVAGINAL US NON-OB: CPT | Mod: 26 | Performed by: RADIOLOGY

## 2022-05-16 PROCEDURE — 76856 US EXAM PELVIC COMPLETE: CPT | Mod: 26 | Performed by: RADIOLOGY

## 2022-06-23 ENCOUNTER — OFFICE VISIT (OUTPATIENT)
Dept: FAMILY MEDICINE | Facility: CLINIC | Age: 52
End: 2022-06-23
Payer: COMMERCIAL

## 2022-06-23 VITALS
HEIGHT: 66 IN | BODY MASS INDEX: 23.3 KG/M2 | HEART RATE: 67 BPM | DIASTOLIC BLOOD PRESSURE: 79 MMHG | TEMPERATURE: 97.5 F | RESPIRATION RATE: 14 BRPM | WEIGHT: 145 LBS | SYSTOLIC BLOOD PRESSURE: 117 MMHG | OXYGEN SATURATION: 99 %

## 2022-06-23 DIAGNOSIS — N93.9 ABNORMAL UTERINE BLEEDING (AUB): ICD-10-CM

## 2022-06-23 DIAGNOSIS — E61.1 IRON DEFICIENCY: ICD-10-CM

## 2022-06-23 DIAGNOSIS — H93.13 TINNITUS, BILATERAL: ICD-10-CM

## 2022-06-23 DIAGNOSIS — M16.11 ARTHRITIS OF RIGHT HIP: ICD-10-CM

## 2022-06-23 DIAGNOSIS — Z53.20 MAMMOGRAM DECLINED: ICD-10-CM

## 2022-06-23 DIAGNOSIS — M79.644 BILATERAL THUMB PAIN: ICD-10-CM

## 2022-06-23 DIAGNOSIS — Z11.59 NEED FOR HEPATITIS C SCREENING TEST: ICD-10-CM

## 2022-06-23 DIAGNOSIS — M79.645 BILATERAL THUMB PAIN: ICD-10-CM

## 2022-06-23 DIAGNOSIS — R10.31 RIGHT LOWER QUADRANT PAIN: Primary | ICD-10-CM

## 2022-06-23 DIAGNOSIS — Z78.9 VEGETARIAN: ICD-10-CM

## 2022-06-23 PROBLEM — R19.8 ALTERNATING CONSTIPATION AND DIARRHEA: Status: RESOLVED | Noted: 2018-11-26 | Resolved: 2022-06-23

## 2022-06-23 PROBLEM — N92.6 IRREGULAR MENSTRUAL CYCLE: Status: RESOLVED | Noted: 2018-11-26 | Resolved: 2022-06-23

## 2022-06-23 PROBLEM — D50.0 IRON DEFICIENCY ANEMIA DUE TO CHRONIC BLOOD LOSS: Status: RESOLVED | Noted: 2018-11-26 | Resolved: 2022-06-23

## 2022-06-23 PROBLEM — R10.2 PELVIC PAIN IN FEMALE: Status: RESOLVED | Noted: 2018-11-26 | Resolved: 2022-06-23

## 2022-06-23 PROBLEM — R93.89 ENDOMETRIAL THICKENING ON ULTRASOUND: Status: RESOLVED | Noted: 2018-11-26 | Resolved: 2022-06-23

## 2022-06-23 LAB
BASOPHILS # BLD AUTO: 0 10E3/UL (ref 0–0.2)
BASOPHILS NFR BLD AUTO: 1 %
EOSINOPHIL # BLD AUTO: 0.1 10E3/UL (ref 0–0.7)
EOSINOPHIL NFR BLD AUTO: 2 %
ERYTHROCYTE [DISTWIDTH] IN BLOOD BY AUTOMATED COUNT: 12.3 % (ref 10–15)
HCT VFR BLD AUTO: 40.3 % (ref 35–47)
HCV AB SERPL QL IA: NONREACTIVE
HGB BLD-MCNC: 13.8 G/DL (ref 11.7–15.7)
IMM GRANULOCYTES # BLD: 0 10E3/UL
IMM GRANULOCYTES NFR BLD: 0 %
LYMPHOCYTES # BLD AUTO: 1.4 10E3/UL (ref 0.8–5.3)
LYMPHOCYTES NFR BLD AUTO: 32 %
MCH RBC QN AUTO: 29.7 PG (ref 26.5–33)
MCHC RBC AUTO-ENTMCNC: 34.2 G/DL (ref 31.5–36.5)
MCV RBC AUTO: 87 FL (ref 78–100)
MONOCYTES # BLD AUTO: 0.3 10E3/UL (ref 0–1.3)
MONOCYTES NFR BLD AUTO: 6 %
NEUTROPHILS # BLD AUTO: 2.7 10E3/UL (ref 1.6–8.3)
NEUTROPHILS NFR BLD AUTO: 59 %
PLATELET # BLD AUTO: 274 10E3/UL (ref 150–450)
RBC # BLD AUTO: 4.64 10E6/UL (ref 3.8–5.2)
VIT B12 SERPL-MCNC: 533 PG/ML (ref 232–1245)
WBC # BLD AUTO: 4.5 10E3/UL (ref 4–11)

## 2022-06-23 PROCEDURE — 82728 ASSAY OF FERRITIN: CPT | Performed by: FAMILY MEDICINE

## 2022-06-23 PROCEDURE — 99214 OFFICE O/P EST MOD 30 MIN: CPT | Performed by: FAMILY MEDICINE

## 2022-06-23 PROCEDURE — 36415 COLL VENOUS BLD VENIPUNCTURE: CPT | Performed by: FAMILY MEDICINE

## 2022-06-23 PROCEDURE — 83550 IRON BINDING TEST: CPT | Performed by: FAMILY MEDICINE

## 2022-06-23 PROCEDURE — 82607 VITAMIN B-12: CPT | Performed by: FAMILY MEDICINE

## 2022-06-23 PROCEDURE — 80050 GENERAL HEALTH PANEL: CPT | Performed by: FAMILY MEDICINE

## 2022-06-23 PROCEDURE — 86803 HEPATITIS C AB TEST: CPT | Performed by: FAMILY MEDICINE

## 2022-06-23 RX ORDER — IBUPROFEN 200 MG
600 TABLET ORAL DAILY
COMMUNITY
End: 2023-11-06

## 2022-06-23 NOTE — PATIENT INSTRUCTIONS
Let sdo labs to evaluate right lower abdominal pian  Ultrasound in may was not revealing of cause  See gyn as planned , referral given  If no answer there consider a ct abdomen   If no answer there would recommend investigating right hip arthritis as potential contributor to pain and can do xray and refer to the hip doctor at that time  See you back in 3 months for a physical  Contact us sooner if anything changes

## 2022-06-23 NOTE — RESULT ENCOUNTER NOTE
Alice Fischer,  Some of your results came back and are within acceptable limits. -Normal red blood cell (hgb) levels, normal white blood cell count and normal platelet levels.. If you have any further concerns please do not hesitate to contact us by message, phone or making an appointment.  Have a good day   Dr Darius CLINE

## 2022-06-23 NOTE — RESULT ENCOUNTER NOTE
Alice Fischer,  Some of your results came back and you have a normal vitamin B12 level.  If you have any further concerns please do not hesitate to contact us by message, phone or making an appointment.  Have a good day   Dr Darius CLINE

## 2022-06-23 NOTE — PROGRESS NOTES
Assessment & Plan     Right lower quadrant pain  New since mid May 2022 but similar to 2018 when see for similar symptoms. Exam, u/s , labs to date unremarkable. Differentials include musculoskeletal, versus hernia ( non felt or seen)  versus appendix abscess( exam not suggestive of this)  versus worsening hip arthritis ( symptoms seems higher up then where would expect groin pain) versus uterine causes versus constipation. Does not appear ill and an acute abdomen is currently not present. Unclear etiology/diagnosis with uncertain prognosis requiring further workup below.discussed options. Desires due to cost to go in s tep wise fashion. Feels strongly it is uterine so will start with gyn first. Will do labs today as well. See gyn as planned , referral given. If no answer there consider a ct abdomen. If no answer there would recommend investigating right hip arthritis as potential contributor to pain and can do xray and refer to the hip doctor at that time. See back in 3 months for a physical but to contact us sooner if anything changes   - Ob/Gyn Referral  - Comprehensive metabolic panel (BMP + Alb, Alk Phos, ALT, AST, Total. Bili, TP); Future  - TSH with free T4 reflex; Future  - Comprehensive metabolic panel (BMP + Alb, Alk Phos, ALT, AST, Total. Bili, TP)  - TSH with free T4 reflex    Abnormal uterine bleeding (AUB)  - Ob/Gyn Referral  - TSH with free T4 reflex; Future  - TSH with free T4 reflex    Iron deficiency  No longer anemic, check for iron deficiency  - CBC with platelets and differential; Future  - Iron and iron binding capacity; Future  - Ferritin; Future  - Vitamin B12; Future  - CBC with platelets and differential  - Iron and iron binding capacity  - Ferritin  - Vitamin B12    Vegetarian  - CBC with platelets and differential; Future  - Iron and iron binding capacity; Future  - Ferritin; Future  - Vitamin B12; Future  - CBC with platelets and differential  - Iron and iron binding capacity  -  Ferritin  - Vitamin B12    Arthritis of right hip  May be contributing to current symptoms. If no answer with above there would recommend investigating right hip arthritis as potential contributor to pain and can do xray and refer to the hip doctor at that time    Tinnitus, bilateral  Unchanged chronic. Suspect has some eustachian tube dysfunction sometimes. Currently no hearing issues. Can refer to ENT if worse.    Bilateral thumb pain  Manageable     Mammogram declined  Will consider mammogram and  consult given fh.     Need for hepatitis C screening test recommended per CDC recommendation.  - Hepatitis C Screen Reflex to HCV RNA Quant and Genotype; Future  - Hepatitis C Screen Reflex to HCV RNA Quant and Genotype    Review of the result(s) of each unique test - diagnostics since 2018  Independent interpretation of a test performed by another physician/other qualified health care professional (not separately reported) - recent 5/2022 work up   Diagnosis or treatment significantly limited by social determinants of health - gaps in care  Ordering of each unique test  36 minutes spent on the date of the encounter doing chart review, history and exam, documentation and further activities per the note     Regular exercise  See Patient Instructions    Return in about 3 months (around 9/23/2022) for Routine preventive, with me, in person.    Reena Mccoy MD  [facility]    Zeferino Arias is a 51 year old, presenting for the following health issues:  Pelvic Pain (Result//)      History of Present Illness       Reason for visit:  Endometrial pain    She eats 4 or more servings of fruits and vegetables daily.She consumes 1 sweetened beverage(s) daily.She exercises with enough effort to increase her heart rate 10 to 19 minutes per day.  She exercises with enough effort to increase her heart rate 3 or less days per week.   She is taking medications regularly.     Pt state here for a  follow up from a [facility]. She is having pain and taking ibuprofen     Former smoker, vegetarian, history of anemia  & iron deficiency in the past noted to be long-standing diagnosed at age 5 and then chronically anemic lost track of it initially as a child recalls told was on fortified cereal than not addressed until  was an adult and came up as a diagnosis, hx of irregular menstrual cycle, endometrial thickening on a prior pelvic ultrasound, hx of right hip arthritis & pain, eustachian tube dysfunction & chronic b/l tinnitus, b/l thumb pain hx of right foot pain in the past, hx of resolved pleuritis, hx of bright red blood per rectum status post colonoscopy which showed internal hemorrhoids non bleeding at the time in 2016, allergic to sulfa drugs, currently on b 12, b 6     Seen for the first time November 6, 2018 for right lower quadrant pain, irregular menstrual bleeding, pelvic pain, rectal bleeding, alternating constipation and diarrhea, body aches, generalized weakness. Workup revealed a normal exam.  CBC showed mild anemia hemoglobin of 10.5 with microcytic hypochromic indices and low iron of 23, normal B12, x-ray showed mild right hip arthritis worse since 2013 and CMP, lipids, TSH were normal and urine culture done for questionable UA was unremarkable. Pelvic U/S 11/9/18 showed an abnormally thickened endometrium with mild heterogeneity, measuring up to 22 mm &advised to consider OB/GYN consultation,  Seen 11/26/2018 & noted while had looked at the low fodmaps diet, did not notice any change but had not tried it long enough.  Wondered about a probiotic and seeing GI.  Declined mammogram as felt grandmother had breast cancer post menopause. Pelvic Pap up-to-date and due again in 2021.  Declined flu shot and HIV testing.  Noted that her insurance was changing and she had a new job to start in December 2018 and was to be  switching to [employer].  So declined any referrals until  proceeded with with  the new insurance.  Had trouble taking iron supplements as they would make her constipation worse.  Plus being a vegetarian it was hard for her to get vegetarian iron supplements that worked for her.  Would be interested in seeing a hematologist to workup her symptoms in addition to seeing GYN and GI. These referrals were in place. Not seen elsewhere and lost to follow up    Seen by gyn 3/10/21 when no mention made of RLQ pain or cycle and seen for routine physical and pap noted not due then till 2023, declined mammogram and STI testing done due to presence of a new partner since last tested & gc, HIV and syphilis came back negative.   Seen by dermatology 5/2022.     Seen next in  on 5/14/22 for RLQ pain of reported 1 week at the time. Exam, UA, HCG, & CBC were normal. A pelvic u/s was done that was normal with mild heterogenous look to the uterine myometrium.     CURRENTLY   Here to follow up on pelvic u/s ordered by another provider in mid may ( 1 month ago) for RLQ pain that had started 1 week at the time and now going on about 6 weeks. similar to RLQ discomfort noted in 2018 when first seen by this provider . Not seen since then.   Notes about 1 month ago had sharp pain in right lower abdomen, ( points to anterior right mid to lower abdomen just lateral and slightly lower to umbilicus but not in inguinal area above iliac crest. Noted experienced mild pain right lower abdomen, Symptoms happened on a Friday night, couldn't really sleep , movement caused stabbing pain, by Saturday am couldn't get out of bed, moving slow and trying to manage, & so came into urgent care here, seen at extended hrs, had blood and urine tests which were negative. Doc recommended an ultrasound, did that on the Monday after 5/14/22 & was concerned about findings. This time u/s did not show a thickened endometrium  and was reported normal with some heterogenous myometrium findings. Going through period right now. Last one was 11 months  ago. In the past periods were hard on her body   Had a hx of iron deficiency, most recent cbc showed no anemia.  Remains a Vegetarian,   No longer with constipation alt diarrhea as noted in 2018, maybe just some constipation   Currently RLQ pain felt not related to eating or diet or bowel or bladder movements  Since mid may has had RLQ pain more like a dull ache very localized in right lower part of abdomen, worse in am and gets better with movement and has had to take ibuprofen 3 of 200 mg daily since mid may. Has to be careful how she moves her body bending and twisting.  Is sure is related to her uterus and not her hip     Reports No fever or chills, no headache or dizziness, no double or blurry vision, no facial pain, occasional earaches and ringing in ears not new, no sore throat, runny nose, post nasal drip, no trouble hearing, smelling, tasting or swallowing, no cough , no chest pain, trouble breathing or palpitations, No heart burn, reflux, nausea or vomiting or diarrhea, no blood in stools or black stools, no weight loss or night sweats. No dysuria, hematuria, frequency, urgency, hesitancy, incontinence, No pelvic complaints. No leg swelling.No rash.    Some joint pain in thumbs going on for a while    Declines mammogram as feels not at risk. Grand mother had breast cancer after menopause.  Cost of u/s was 900 & wants to be Conservative about tests  Ok with hep c screen  No new sexual partner since 2021. Declines need for other STI testing.  Going through some life transitions right now and wondering if body more susceptible due to that stress.     Will return for a preventive physical another time    Review of Systems   Constitutional, HEENT, cardiovascular, pulmonary, GI, , musculoskeletal, neuro, skin, endocrine and psych systems are negative, except as otherwise noted.      Objective    /79 (BP Location: Right arm, Patient Position: Chair, Cuff Size: Adult Regular)   Pulse 67   Temp 97.5  F  "(36.4  C) (Temporal)   Resp 14   Ht 1.676 m (5' 6\")   Wt 65.8 kg (145 lb)   LMP 06/17/2022 (Exact Date)   SpO2 99%   BMI 23.40 kg/m    Body mass index is 23.4 kg/m .  Physical Exam   GENERAL: healthy, alert and no distress  EYES: Eyes grossly normal to inspection, PERRL and conjunctivae and sclerae normal  HENT: ear canals and TM's normal, nose and mouth without ulcers or lesions  NECK: no adenopathy, no asymmetry, masses, or scars and thyroid normal to palpation  RESP: lungs clear to auscultation - no rales, rhonchi or wheezes  CV: regular rate and rhythm, normal S1 S2, no S3 or S4, no murmur, click or rub, no peripheral edema and peripheral pulses strong  ABDOMEN: soft, nontender, no hepatosplenomegaly, no masses and bowel sounds normal. Where she has localized pain is an area right and below umbilicus in right anterior upper half of lower abdomen above the level of iliac crest and not involving groin area. Rovsing, obturator and psoas signs negative. Unable to illicit discomfort on exam.  MS: no gross musculoskeletal defects noted, no edema. full range of motion right hip and SANDRA test negative.   SKIN: no suspicious lesions or rashes  NEURO: Normal strength and tone, mentation intact and speech normal  PSYCH: mentation appears normal, affect normal/bright    Results for orders placed or performed in visit on 06/23/22   Hepatitis C Screen Reflex to HCV RNA Quant and Genotype     Status: Normal   Result Value Ref Range    Hepatitis C Antibody Nonreactive Nonreactive    Narrative    Assay performance characteristics have not been established for newborns, infants, and children.   Vitamin B12     Status: Normal   Result Value Ref Range    Vitamin B12 533 232 - 1,245 pg/mL   CBC with platelets and differential     Status: None   Result Value Ref Range    WBC Count 4.5 4.0 - 11.0 10e3/uL    RBC Count 4.64 3.80 - 5.20 10e6/uL    Hemoglobin 13.8 11.7 - 15.7 g/dL    Hematocrit 40.3 35.0 - 47.0 %    MCV 87 78 - 100 " fL    MCH 29.7 26.5 - 33.0 pg    MCHC 34.2 31.5 - 36.5 g/dL    RDW 12.3 10.0 - 15.0 %    Platelet Count 274 150 - 450 10e3/uL    % Neutrophils 59 %    % Lymphocytes 32 %    % Monocytes 6 %    % Eosinophils 2 %    % Basophils 1 %    % Immature Granulocytes 0 %    Absolute Neutrophils 2.7 1.6 - 8.3 10e3/uL    Absolute Lymphocytes 1.4 0.8 - 5.3 10e3/uL    Absolute Monocytes 0.3 0.0 - 1.3 10e3/uL    Absolute Eosinophils 0.1 0.0 - 0.7 10e3/uL    Absolute Basophils 0.0 0.0 - 0.2 10e3/uL    Absolute Immature Granulocytes 0.0 <=0.4 10e3/uL   CBC with platelets and differential     Status: None    Narrative    The following orders were created for panel order CBC with platelets and differential.  Procedure                               Abnormality         Status                     ---------                               -----------         ------                     CBC with platelets and d...[[id number]]                      Final result                 Please view results for these tests on the individual orders.               .  ..

## 2022-06-23 NOTE — RESULT ENCOUNTER NOTE
Alice Fischer,  Your results came back and are within acceptable limits. -Hepatitis C antibody screen test shows no signs of a previous hepatitis C infection.. If you have any further concerns please do not hesitate to contact us by message, phone or making an appointment.  Have a good day   Dr Darius CLINE

## 2022-06-25 ENCOUNTER — MYC MEDICAL ADVICE (OUTPATIENT)
Dept: FAMILY MEDICINE | Facility: CLINIC | Age: 52
End: 2022-06-25

## 2022-06-25 DIAGNOSIS — M79.645 BILATERAL THUMB PAIN: ICD-10-CM

## 2022-06-25 DIAGNOSIS — M79.644 BILATERAL THUMB PAIN: ICD-10-CM

## 2022-06-25 DIAGNOSIS — M16.11 ARTHRITIS OF RIGHT HIP: Primary | ICD-10-CM

## 2022-06-26 LAB
ALBUMIN SERPL-MCNC: 3.7 G/DL (ref 3.4–5)
ALP SERPL-CCNC: 68 U/L (ref 40–150)
ALT SERPL W P-5'-P-CCNC: 12 U/L (ref 0–50)
ANION GAP SERPL CALCULATED.3IONS-SCNC: 8 MMOL/L (ref 3–14)
AST SERPL W P-5'-P-CCNC: 14 U/L (ref 0–45)
BILIRUB SERPL-MCNC: 0.5 MG/DL (ref 0.2–1.3)
BUN SERPL-MCNC: 7 MG/DL (ref 7–30)
CALCIUM SERPL-MCNC: 8.6 MG/DL (ref 8.5–10.1)
CHLORIDE BLD-SCNC: 108 MMOL/L (ref 94–109)
CO2 SERPL-SCNC: 22 MMOL/L (ref 20–32)
CREAT SERPL-MCNC: 0.82 MG/DL (ref 0.52–1.04)
FERRITIN SERPL-MCNC: 12 NG/ML (ref 8–252)
GFR SERPL CREATININE-BSD FRML MDRD: 86 ML/MIN/1.73M2
GLUCOSE BLD-MCNC: 83 MG/DL (ref 70–99)
IRON SATN MFR SERPL: 21 % (ref 15–46)
IRON SERPL-MCNC: 82 UG/DL (ref 35–180)
POTASSIUM BLD-SCNC: 4.4 MMOL/L (ref 3.4–5.3)
PROT SERPL-MCNC: 7.3 G/DL (ref 6.8–8.8)
SODIUM SERPL-SCNC: 138 MMOL/L (ref 133–144)
TIBC SERPL-MCNC: 399 UG/DL (ref 240–430)
TSH SERPL DL<=0.005 MIU/L-ACNC: 1.5 MU/L (ref 0.4–4)

## 2022-06-27 NOTE — RESULT ENCOUNTER NOTE
Alice Ms. Peresnadja,  Some of your results came back and are within acceptable limits. -Liver and gallbladder tests are normal (ALT,AST, Alk phos, bilirubin), kidney function is normal (Cr, GFR), sodium is normal, potassium is normal, calcium is normal, glucose is normal.  -TSH (thyroid stimulating hormone) level is normal which indicates normal thyroid function.  -iron levels are normal improved from 2018 & Ferritin which is a .storage form of iron is  normal but on the lower limit of normal so increasing source of iron in your diet will be helpful. If you have any further concerns please do not hesitate to contact us by message, phone or making an appointment.  Have a good day   Dr Darius CLINE

## 2022-06-28 NOTE — TELEPHONE ENCOUNTER
Most common cause of arthritis is osteoarthritis. ( OA)   You have known osteoarthritis of your right hip proven on xray done in 2018  If any arthritis is the cause of your RLQ pain then the most likely diagnosis is OA   There is no lab test to confirm this  It is based on xray findings & clinical symptoms.  An mri can be done to get a more clear diagnosis  I could refer you to ortho  A steroid shot by them in the hip could be done for both therapuetic and diagnostic purposes. If helpful and resolves your pain even if briefly it would suggest the right lower abdominal pain may be stemming from your hip    Tests like CRP and ESR are non specific markers of inflammation , they could go up if you have a skin infection , arthritis of any kind ( cannot differetiate ) including osteo arthritis, rheumatoid arthritis, a cold or a viral infection etc. If these test are positive they mean nothing more than inflammation somewhere and would not . A level does not correlate with extent of disease or flare ups. We could expect it to be mildly elevated given known OA but it can also be normal    There are blood tests for rheumatoid arthritis like RF and CCP  In some Rheumatoid arthritis disease these tests can be positive but sometimes they are also negative  We dont just go by a test result it has to fit in with a whole clinical picture to come to a diagnosis and currently your symptoms do not match fully the clinical findings of rheumatoid arthritis. The base of thumb jt pain b/l is also suggestive of OA of the Thumb joints.    I just want to give this back ground but I am more than happy to order all these tests( crp, esr, rf, ccp) but just know though, that we dont use them to make a diagnosis . A test in light of a clinical picture where multiple symptoms/ sign criteria are met is when a certain diagnosis can be made.     I'll put these lab orders in and may get any time. If there is anything positive result  we can have you see a rheumatologist to get clarity on their meaning.   Also if you would like an ortho referral let me know  We could repeat the xray of your hip to see progression compared to 2018

## 2022-06-29 NOTE — TELEPHONE ENCOUNTER
Writer responded via [employer].    RAYO ZengN, RN  [facility]

## 2022-09-03 ENCOUNTER — HEALTH MAINTENANCE LETTER (OUTPATIENT)
Age: 52
End: 2022-09-03

## 2022-09-16 ENCOUNTER — OFFICE VISIT (OUTPATIENT)
Dept: OBGYN | Facility: CLINIC | Age: 52
End: 2022-09-16
Payer: COMMERCIAL

## 2022-09-16 VITALS
WEIGHT: 145 LBS | BODY MASS INDEX: 23.3 KG/M2 | SYSTOLIC BLOOD PRESSURE: 117 MMHG | HEIGHT: 66 IN | DIASTOLIC BLOOD PRESSURE: 73 MMHG | HEART RATE: 68 BPM | OXYGEN SATURATION: 100 %

## 2022-09-16 DIAGNOSIS — R10.31 RLQ ABDOMINAL PAIN: Primary | ICD-10-CM

## 2022-09-16 DIAGNOSIS — R10.2 PELVIC PAIN IN FEMALE: ICD-10-CM

## 2022-09-16 PROCEDURE — 99214 OFFICE O/P EST MOD 30 MIN: CPT | Performed by: OBSTETRICS & GYNECOLOGY

## 2022-09-16 NOTE — PROGRESS NOTES
GYN Clinic Visit  Date of visit: 2022   Chief Complaint: pelvic pain    HPI:   Juana Fischer is a 51 year old perimenopausal  female who presents for evaluation of RLQ pain.    On and off for a few year  Abdominal pain improving, dull and very mild at this time  Coincides with periods - pain occurs a few days before period  Sharp/Dull pinch in RLQ  May was particularly painful  Had a period in May and in . previously did not have a period for 10mo. No bleeding since then.       Ultrasound 2022 wnl  The uterus measures 3.1 cm x 6.6 cm x 4.5 cm, and there is no evidence  of a focal fibroid. Uterine echogenicity is mildly heterogenous. The  endometrial stripe measures 5 mm which is within normal limits. There  is no free fluid in the pelvis.     The right ovary measures 1.3 cm x 1.9 cm x 1.9 cm and the left ovary  measures 1.3 cm x 2.4 cm x 2.5 cm. There is no adnexal mass. Both  ovaries demonstrate homogenous parenchymal echotexture. There is  normal appearing blood flow to the ovaries on color Doppler.                                                                   IMPRESSION: Mildly heterogenous uterine myometrium. Pelvic ultrasound  is otherwise normal.      Obstetric History:   OB History    Para Term  AB Living   2 1 1 0 1 1   SAB IAB Ectopic Multiple Live Births   1 0 0 0 1      # Outcome Date GA Lbr Jaycob/2nd Weight Sex Delivery Anes PTL Lv   2 Term 04 40w0d  3.43 kg (7 lb 9 oz) F Vag-Spont   FERNANDEZ      Name: Carolina   2004               Gynecologic History:  No LMP recorded. Patient is premenopausal.  STI history: none  Last Pap: 4/10/18 NIL, neg HR HPV. Due        Past Medical History:  Past Medical History:   Diagnosis Date     Abdominal pain, right lower quadrant 2018     Alternating constipation and diarrhea 2018     Anemia 1985     Endometrial thickening on ultrasound 2018     Eustachian tube dysfunction      Iron deficiency  anemia due to chronic blood loss 2018     Irregular menstrual cycle 2018     Pelvic pain in female 2018     Pleuritis 2011     Rectal bleed 2016     Rectal bleeding 7/15/2016     Right foot pain 2017    xray neg     Right hip pain        Past Surgical History:  Past Surgical History:   Procedure Laterality Date     COLONOSCOPY N/A 2016    Procedure: COLONOSCOPY;  Surgeon: Eber Burns MD;  Location:  GI         Medications:  Current Outpatient Medications   Medication     folic acid-vit B6-vit B12 (FOLGARD) 0.8-10-0.115 MG TABS per tablet     ibuprofen (ADVIL/MOTRIN) 200 MG tablet     No current facility-administered medications for this visit.       Allergy:  Allergies   Allergen Reactions     Sulfa Drugs Rash     Patient denies food, latex or environmental allergies.     Social History:  Social History     Tobacco Use     Smoking status: Former Smoker     Quit date: 1997     Years since quittin.7     Smokeless tobacco: Never Used   Vaping Use     Vaping Use: Never used   Substance Use Topics     Alcohol use: Yes     Comment: Rarely     Drug use: No        Family History   Problem Relation Age of Onset     Cancer Father         lung CA, smoker     Alcoholism Father      Alcoholism Mother      Thyroid Disease Mother      Diverticulitis Mother      Cancer Maternal Grandmother         Breast CA     Alzheimer Disease Paternal Grandmother      Diabetes No family hx of      Coronary Artery Disease No family hx of      Hypertension No family hx of      Hyperlipidemia No family hx of      Cerebrovascular Disease No family hx of      Breast Cancer No family hx of      Colon Cancer No family hx of      Prostate Cancer No family hx of      Other Cancer No family hx of      Depression No family hx of      Anxiety Disorder No family hx of      Mental Illness No family hx of      Substance Abuse No family hx of      Anesthesia Reaction No family hx of      Asthma No family hx  "of      Osteoporosis No family hx of      Genetic Disorder No family hx of      Obesity No family hx of      Unknown/Adopted No family hx of        Physical Exam:  Vitals:    22 1426   BP: 117/73   Pulse: 68   SpO2: 100%   Weight: 65.8 kg (145 lb)   Height: 1.676 m (5' 6\")     Body mass index is 23.4 kg/m .    Gen: healthy, alert, active, no distress  Abd: soft, non-tender, non-distended, no masses, no hernias  Extremities: nontender, no edema  :   - external genitalia: vulva and perineum are normal without lesion, mass or erythema  - urethra: well supported urethra, no hypermobility, normal Skenes and Bartholins.   - bladder: no tenderness, no masses  - vagina: intact, rugated mucosa without lesions or abnormal discharge  - uterus: normal size anteverted, no masses or tenderness  - adnexa: +R adnexal tenderness. No rebound or guarding. no masses  - rectal: deferred      Assessment:  Juana Fischer is a 51 year old  who presents in consultation for RLQ pain. Some right adnexal tenderness on exam. Unclear etiology but discussed could be ovulation pain or endometriosis. Recent pelvic US wnl.    Plan:  1. RLQ abdominal pain  Recommend patient call for repeat US if severe pain recurs.  - US OB Transvaginal Only; Future    2. Pelvic pain in female  - US OB Transvaginal Only; Future      Liana Meeks MD      "

## 2023-06-02 ENCOUNTER — HEALTH MAINTENANCE LETTER (OUTPATIENT)
Age: 53
End: 2023-06-02

## 2023-11-01 ENCOUNTER — ANCILLARY PROCEDURE (OUTPATIENT)
Dept: GENERAL RADIOLOGY | Facility: CLINIC | Age: 53
End: 2023-11-01
Attending: INTERNAL MEDICINE
Payer: COMMERCIAL

## 2023-11-01 ENCOUNTER — TELEPHONE (OUTPATIENT)
Dept: ORTHOPEDICS | Facility: CLINIC | Age: 53
End: 2023-11-01
Payer: COMMERCIAL

## 2023-11-01 ENCOUNTER — OFFICE VISIT (OUTPATIENT)
Dept: URGENT CARE | Facility: URGENT CARE | Age: 53
End: 2023-11-01
Payer: COMMERCIAL

## 2023-11-01 VITALS
TEMPERATURE: 98.1 F | HEART RATE: 82 BPM | BODY MASS INDEX: 24.11 KG/M2 | SYSTOLIC BLOOD PRESSURE: 124 MMHG | DIASTOLIC BLOOD PRESSURE: 79 MMHG | WEIGHT: 150 LBS | HEIGHT: 66 IN | OXYGEN SATURATION: 99 %

## 2023-11-01 DIAGNOSIS — R07.1 PAINFUL BREATHING: ICD-10-CM

## 2023-11-01 DIAGNOSIS — S22.31XA CLOSED FRACTURE OF ONE RIB OF RIGHT SIDE, INITIAL ENCOUNTER: ICD-10-CM

## 2023-11-01 DIAGNOSIS — S20.211A CHEST WALL CONTUSION, RIGHT, INITIAL ENCOUNTER: ICD-10-CM

## 2023-11-01 DIAGNOSIS — S70.01XA CONTUSION OF RIGHT HIP, INITIAL ENCOUNTER: ICD-10-CM

## 2023-11-01 DIAGNOSIS — S22.41XA CLOSED FRACTURE OF MULTIPLE RIBS OF RIGHT SIDE, INITIAL ENCOUNTER: Primary | ICD-10-CM

## 2023-11-01 PROCEDURE — 71101 X-RAY EXAM UNILAT RIBS/CHEST: CPT | Mod: TC | Performed by: RADIOLOGY

## 2023-11-01 PROCEDURE — 99214 OFFICE O/P EST MOD 30 MIN: CPT | Performed by: INTERNAL MEDICINE

## 2023-11-01 RX ORDER — NAPROXEN 500 MG/1
500 TABLET ORAL 2 TIMES DAILY WITH MEALS
Qty: 28 TABLET | Refills: 0 | Status: CANCELLED | OUTPATIENT
Start: 2023-11-01 | End: 2023-11-15

## 2023-11-01 RX ORDER — HYDROCODONE BITARTRATE AND ACETAMINOPHEN 5; 325 MG/1; MG/1
1 TABLET ORAL EVERY 6 HOURS PRN
Qty: 10 TABLET | Refills: 0 | Status: CANCELLED | OUTPATIENT
Start: 2023-11-01 | End: 2023-11-04

## 2023-11-01 RX ORDER — HYDROCODONE BITARTRATE AND ACETAMINOPHEN 5; 325 MG/1; MG/1
1 TABLET ORAL EVERY 6 HOURS PRN
Qty: 18 TABLET | Refills: 0 | Status: SHIPPED | OUTPATIENT
Start: 2023-11-01 | End: 2023-11-04

## 2023-11-01 ASSESSMENT — ENCOUNTER SYMPTOMS
FATIGUE: 1
HEADACHES: 1

## 2023-11-01 NOTE — PROGRESS NOTES
ASSESSMENT AND PLAN:      ICD-10-CM    1. Closed fracture of multiple ribs of right side, initial encounter  S22.41XA HYDROcodone-acetaminophen (NORCO) 5-325 MG tablet      2. Painful breathing  R07.1 XR Ribs & Chest Right G/E 3 Views      3. Chest wall contusion, right, initial encounter  S20.211A XR Ribs & Chest Right G/E 3 Views      4. Contusion of right hip, initial encounter  S70.01XA       5. Closed fracture of one rib of right side, initial encounter  S22.31XA Miscellaneous Order for DME - ONLY FOR DME        Patient Instructions         X-ray of ribs suspicious for fracture.  Lungs look clear.  No hole or bleeding noted from fracture.  Heart size is normal.  Radiology review pending    Rib belt or a large Ace bandage for comfort.  Please take off throughout the day to take deep breaths to prevent pneumonia.    Ice to area    ibuprofen 400 mg to 600 mg 3 daily with food.  Alternate with tylenol ES 3 x day  Do not drive or operate machinery on this medication.    Recheck with your primary clinic in the next 1 to 2 weeks for further concerns      Addendum -I had noticed potentially up to 2 but definitely 1 rib fracture.  No obvious pleural effusion.  Radiology noted up 3 to rib fractures patient.  Small pleural effusion noted by radiology.  Initially patient declined Vicodin for pain control when I offered her at her appointment after phone call to discuss radiology review,, patient was open to Vicodin prescription.  It was sent to her pharmacy and she will decide whether she would like to pick it up or not.  Requested that she be recheck in 1 week, would consider repeat x-ray  If there is any concerns, radiology did suggest CT to further assess.        Bertha Mehta MD  [facility]    Subjective     Juana Fischer is a 52 year old who presents for Patient presents with:  Urgent Care  Back Pain: X2 days ago fell and hurt back, back pain has not gone away     an established  "patient of [facility].    Back Pain    Onset of symptoms was 4 day(s) ago.  Location: right side  Fell on right hip  Pain from right arm pit to waist and hip and right mid back  Context:       The injury happened while while walking, fell in mid night & bathroom floor      Mechanism: slipped & fall      Patient experienced immediate pain    Current and Associated symptoms: pain      Aggravating Factors: bending and twisting  Therapies to improve symptoms include: heat, ice, ibuprofen, rest, and arnica      Review of Systems   Constitutional:  Positive for fatigue.   Neurological:  Positive for headaches.           Objective    /79   Pulse 82   Temp 98.1  F (36.7  C) (Temporal)   Ht 1.676 m (5' 6\")   Wt 68 kg (150 lb)   SpO2 99%   BMI 24.21 kg/m    Physical Exam  Vitals reviewed.   Constitutional:       Appearance: Normal appearance.   Cardiovascular:      Rate and Rhythm: Normal rate and regular rhythm.      Pulses: Normal pulses.      Heart sounds: Normal heart sounds.   Pulmonary:      Effort: Pulmonary effort is normal. No respiratory distress.      Breath sounds: Normal breath sounds.   Chest:      Chest wall: Tenderness (Pain reproduced over right side chest wall/rib cage anterior midline and posterior.  No ecchymosis swelling or crepitus noted on exam) present.   Abdominal:      Tenderness: There is no right CVA tenderness or left CVA tenderness.   Musculoskeletal:      Comments:     No cervical thoracic or lumbar spine tenderness with palpation    Large right upper thigh contusion/bruise noted.  Inferior to trochanter head.  No pain over trochanter head or hip joint.   Neurological:      Mental Status: She is alert.            Xray - Reviewed and interpreted by me.  Rib fracture noted  No pneumothorax or fluid  Heart size normal       "

## 2023-11-01 NOTE — TELEPHONE ENCOUNTER
[facility] Call Center    Phone Message    May a detailed message be left on voicemail: no     Reason for Call: Requesting c/b- unsure if patient should see Sports Med or Spine. She had an injury to her right side. Said from armpit to hip. She said she was looking for someone to wrap it and possibly do a chest x-ray.

## 2023-11-01 NOTE — Clinical Note
Joey Valles.  Patient states you are her primary and she like to follow-up with you.  I was hoping she could be seen in 1 week for follow-up rib fractures.  Would consider repeating x-ray at this time to check for any complications or pleural effusion.  I did not really notice a pleural effusion even with repeat checking x-ray but radiology commented on small pleural effusion.  Chet

## 2023-11-01 NOTE — PATIENT INSTRUCTIONS
X-ray of ribs suspicious for fracture.  Lungs look clear.  No hole or bleeding noted from fracture.  Heart size is normal.  Radiology review pending    Rib belt or a large Ace bandage for comfort.  Please take off throughout the day to take deep breaths to prevent pneumonia.    Ice to area    ibuprofen 400 mg to 600 mg 3 daily with food.  Alternate with tylenol ES 3 x day  Do not drive or operate machinery on this medication.    Recheck with your primary clinic in the next 1 to 2 weeks for further concerns

## 2023-11-01 NOTE — TELEPHONE ENCOUNTER
"Called patient to obtain more information and triage the injury. Called and spoke to the patient.     Patient reports that she fainted and fell in a small bathroom, so she is unsure exactly how the injury occurred. She fell on [place] 10/29 so she is 3 days out from injury. She also has a bruise on the right hip so she believes she fell and hit something on her way down, such as the counter. The ribs are tender with movement but compression feels better. There is pain with coughing or sneezing. Patient also reports some feelings of shortness of breath and feeling winded that are not normal for her.     As for pain, the pain is located on her ribs and she reports the pain goes from her armpit to her hip. Some of the pain wraps around to the back and there is some pain on the spine. The patient reports a \"pinching\" feeling with movement. The pain is the strongest when she is lying down and tries to move or roll over onto either side. There is pain with twisting or bending down, as well as reaching out away from the body with the right arm, no pain with left arm movement.     Patient denies any numbness, tingling, or any weakness of limbs. Patient denies any bruising or redness on the ribs.     Patient has been taking Ibuprofen, and using ice, heat, and arnica cream for pain. The pain has stayed the same since the injury. No previous injury to the area and no other areas of concern.      Will huddle with provider and call patient back.     "

## 2023-11-01 NOTE — TELEPHONE ENCOUNTER
Huddled with Sridhar Fishman PA-C in clinic and due to her passing out and the report of shortness of breath, he recommends going to the ER to better triage. He also suggests following up with primary care first to address why she passed out.    If all that comes back normal, it would be appropriate for sports med to see her and she could make an appointment.     Called and spoke to patient, informing them of these recommendations. Patient was in agreement, although she states she knows the reason of her passing out was from blood loss so she wasn't as concerned.    Patient would rather go to urgent care and opted to go to [facility].     Patient will call back for appointment if needed.    Patient was appreciative of call and verbalized understanding and agreement as to why we were recommending going in to an ER or urgent care.    Susy Patel, KAVITHA, LAT, ATC  Certified Athletic Trainer

## 2023-11-02 ENCOUNTER — TELEPHONE (OUTPATIENT)
Dept: URGENT CARE | Facility: URGENT CARE | Age: 53
End: 2023-11-02
Payer: COMMERCIAL

## 2023-11-02 NOTE — TELEPHONE ENCOUNTER
Spoke with Dr Mehta and informed patient that letter has been written, she will get from her mychart. Pt understands to follow up in 1 week with pcp  Jazmin Domingo, DIDIER

## 2023-11-02 NOTE — LETTER
November 2, 2023      Juana Fischer  [address]  [place] [address]        To Whom It May Concern:    Juana Fischer was seen in our clinic on 11/1/2023 She may return to work with the following: unable to drive due to rib fractures and would recommend working from home. Please call us with any questions.        Sincerely,        Dr Bertha Mehta.    [facility]

## 2023-11-02 NOTE — TELEPHONE ENCOUNTER
Forms/Letter Request    Type of form/letter: Work    Have you been seen for this request: Yes 11/01/2023    Do we have the form/letter: No    Who is the form from? Needs form for work for urgent care visit  (if other please explain) pt stated not driving was  recommend so pt needs something stating she needs to work from home     Where did/will the form come from? Patient or family brought in       When is form/letter needed by: as soon as possible     How would you like the form/letter returned: [employer]    Patient Notified form requests are processed in 3-5 business days:Yes    Could we send this information to you in [employer] or would you prefer to receive a phone call?:   Patient would prefer a phone call   Okay to leave a detailed message?: Yes at Cell number on file:    Telephone Information:   Mobile [phone]

## 2023-11-06 ENCOUNTER — OFFICE VISIT (OUTPATIENT)
Dept: FAMILY MEDICINE | Facility: CLINIC | Age: 53
End: 2023-11-06
Payer: COMMERCIAL

## 2023-11-06 VITALS
HEART RATE: 62 BPM | HEIGHT: 66 IN | SYSTOLIC BLOOD PRESSURE: 114 MMHG | DIASTOLIC BLOOD PRESSURE: 80 MMHG | TEMPERATURE: 98.2 F | OXYGEN SATURATION: 99 % | RESPIRATION RATE: 22 BRPM | WEIGHT: 162 LBS | BODY MASS INDEX: 26.03 KG/M2
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
## 2023-11-06 DIAGNOSIS — S22.41XD CLOSED FRACTURE OF MULTIPLE RIBS OF RIGHT SIDE WITH ROUTINE HEALING, SUBSEQUENT ENCOUNTER: Primary | ICD-10-CM

## 2023-11-06 DIAGNOSIS — U07.1 INFECTION DUE TO 2019 NOVEL CORONAVIRUS: ICD-10-CM

## 2023-11-06 DIAGNOSIS — R07.81 RIB PAIN ON RIGHT SIDE: ICD-10-CM

## 2023-11-06 PROCEDURE — 99215 OFFICE O/P EST HI 40 MIN: CPT | Performed by: FAMILY MEDICINE

## 2023-11-06 ASSESSMENT — PAIN SCALES - GENERAL: PAINLEVEL: EXTREME PAIN (8)

## 2023-11-06 NOTE — PROGRESS NOTES
Assessment & Plan     Closed fracture of multiple ribs of right side with routine healing, subsequent encounter  Rib pain on right side  Fall, subsequent encounter  -Fall from toilet 1 week ago.  -Continue ibuprofen and acetaminophen prn for pain control. Discussed taking ibuprofen with food and water to avoid gastric irritation or kidney injury. Can try OTC lidocaine patch for pain.  -Symptoms stable, no worsening symptoms or respiratory symptoms.  -Discussed slow transition back to work. 6-10 hours/wk from x 3 weeks. Then increase to 10 - 20 hours/wk until next seen by PCP beginning of Dec.  -?Fragility fx due to fall from less than standing height and rib fx without direct trauma. Recommended bone density scan, pt will discuss this with PCP at upcoming visit.  -Discussed repeat Rib xray today, mutual decision to defer as symptoms stable and not worsening.  -ED if respiratory distress arises  -Short term disability and FMLA forms completed today    Covid 19 infection  -Tested positive last week  -Symptoms improving    40 minutes spent by me on the date of the encounter doing chart review, history and exam, documentation and further activities per the note  {    Johan Bruce DO  [facility]    Zeferino Arias is a 52 year old, presenting for the following health issues:  Urgent Care F/U      11/6/2023     1:34 PM   Additional Questions   Roomed by Kasey VARELA     Patient here to follow-up on rib fractures and UC visit from last week.  Was traveling and staying in a hotel last week. Got up in the middle of the night to use the restroom. Was getting up from toilet when she fell onto her right side. Did not hit her head. Did not hit anything on the way to the floor.    Was not feeling well several days after and went to UC.  Xray in [facility] showed mildly displaced fractures fo the lateral right 6th and 8th rib along with nondisplaced fracture of the right lateral 7th rib.    Since  "fall pain has been about the same.  Taking ibuprofen and acetaminophen as needed.  Getting tension in back. Trying to rest but difficult to sleep.  Muscle spasms in lower back.  Denies fever, SOB, wheezing.  Post-menopausal x 1 year.     Unable to drive since fall, pain with turning torso. Working part-time at home since injury.  Needs work forms completed.         Review of Systems         Objective    /80 (BP Location: Right arm, Patient Position: Sitting, Cuff Size: Adult Regular)   Pulse 62   Temp 98.2  F (36.8  C) (Temporal)   Resp 22   Ht 1.676 m (5' 6\")   Wt 73.5 kg (162 lb)   LMP  (LMP Unknown)   SpO2 99%   BMI 26.15 kg/m    Body mass index is 26.15 kg/m .  Physical Exam   GENERAL: healthy, alert and no distress  RESP: lungs clear to auscultation - no rales, rhonchi or wheezes  CV: regular rate and rhythm, normal S1 S2, no S3 or S4, no murmur, click or rub, no peripheral edema and peripheral pulses strong  MS: right lateral chest wall with tenderness to palpation, no creptitus  PSYCH: mentation appears normal, affect normal/bright                      "

## 2023-11-07 ENCOUNTER — MYC MEDICAL ADVICE (OUTPATIENT)
Dept: FAMILY MEDICINE | Facility: CLINIC | Age: 53
End: 2023-11-07
Payer: COMMERCIAL

## 2023-11-10 NOTE — TELEPHONE ENCOUNTER
Faxed [employer] forms to [phone] 11/06/23  Refaxed to updated fax number for FMLA forms to [phone] 11/10/23  Copies sent to abstract  Copies kept in clinic   Patient informed

## 2023-12-05 ENCOUNTER — OFFICE VISIT (OUTPATIENT)
Dept: OBGYN | Facility: CLINIC | Age: 53
End: 2023-12-05
Payer: COMMERCIAL

## 2023-12-05 VITALS
SYSTOLIC BLOOD PRESSURE: 132 MMHG | HEART RATE: 80 BPM | DIASTOLIC BLOOD PRESSURE: 85 MMHG | OXYGEN SATURATION: 97 % | BODY MASS INDEX: 26.36 KG/M2 | WEIGHT: 164 LBS | HEIGHT: 66 IN

## 2023-12-05 DIAGNOSIS — Z01.419 ENCOUNTER FOR GYNECOLOGICAL EXAMINATION WITHOUT ABNORMAL FINDING: Primary | ICD-10-CM

## 2023-12-05 DIAGNOSIS — Z12.4 PAP SMEAR FOR CERVICAL CANCER SCREENING: ICD-10-CM

## 2023-12-05 PROCEDURE — 99396 PREV VISIT EST AGE 40-64: CPT | Performed by: OBSTETRICS & GYNECOLOGY

## 2023-12-05 PROCEDURE — 87624 HPV HI-RISK TYP POOLED RSLT: CPT | Performed by: OBSTETRICS & GYNECOLOGY

## 2023-12-05 PROCEDURE — G0145 SCR C/V CYTO,THINLAYER,RESCR: HCPCS | Performed by: OBSTETRICS & GYNECOLOGY

## 2023-12-05 NOTE — PATIENT INSTRUCTIONS
[email or web address]://[email or web address]/docs/default-source/[email or web address]/nams-[email or web address]-[email or web address]-[email or web address]-statement.[email or web address]      Gabapentin     Shingles vaccine

## 2023-12-05 NOTE — PROGRESS NOTES
"Juana is a 53 year old  who presents for annual exam.   Postmenopausal She is having hot flashes, mild. No vaginal bleeding noted.     Besides routine health maintenance, she has no other health concerns today . Daughter is 18 y/o and in her second year of college University.  She underwent menopause 2022 and still have occasional hot flashes and night sweats.  Did not take any hormone therapy and more interested in taking supplements to help with any symptoms.  Is having a lot of aches and pains especially in her joints that seem to move around.  She has been tested for Lyme's a few times as she was in the [facility] in [place].  GYNECOLOGIC HISTORY:  Menarche:   She is sexually active with 1male partner(s) and she is currently in monogamous relationship.    History sexually transmitted infections:hpv  STI testing offered?  Declined  Estrogen replacement therapy: No  DEBI exposure: Unknown    History of abnormal Pap smear: NO - age 30-65 PAP every 5 years with negative HPV co-testing recommended  Family history of breast CA: Yes (Please explain): mgm  Family history of uterine/ovarian CA: Yes (Please explain): mom  Family history of colon CA: No    HEALTH MAINTENANCE:  Cholesterol: (No components found for: \"CHOL2\" ) History of abnormal lipids: Yes  Mammo: na . History of abnormal Mammo: No  Regular Self Breast Exams: Yes  Colonoscopy: 2016 History of abnormal Colonoscopy: No  Dexa:  History of abnormal Dexa: No  Calcium/Vitamin D intake: source:  , dietary supplement(s) Adequate? Yes  TSH: (No components found for: \"TSH1\" )  Pap; (No results found for: \"PAP\" )    HISTORY:  OB History    Para Term  AB Living   2 1 1 0 1 1    IAB Ectopic Multiple Live Births   1 0 0 0 1      # Outcome Date GA Lbr Jaycob/2nd Weight Sex Delivery Anes PTL Lv   2 Term 04 40w0d  3.43 kg (7 lb 9 oz) F Vag-Spont   FERNANDEZ      Name: Carolina   1 2004             Past Medical History: "   Diagnosis Date    Abdominal pain, right lower quadrant 2018    Alternating constipation and diarrhea 2018    Anemia 1985    Endometrial thickening on ultrasound 2018    Eustachian tube dysfunction     Iron deficiency anemia due to chronic blood loss 2018    Irregular menstrual cycle 2018    Pelvic pain in female 2018    Pleuritis 2011    Rectal bleed 2016    Rectal bleeding 7/15/2016    Right foot pain 2017    xray neg    Right hip pain      Past Surgical History:   Procedure Laterality Date    COLONOSCOPY N/A 2016    Procedure: COLONOSCOPY;  Surgeon: Eber Burns MD;  Location: [facility]     Family History   Problem Relation Age of Onset    Cancer Father         lung CA, smoker    Alcoholism Father     Alcoholism Mother     Thyroid Disease Mother     Diverticulitis Mother     Cancer Maternal Grandmother         Breast CA    Alzheimer Disease Paternal Grandmother     Diabetes No family hx of     Coronary Artery Disease No family hx of     Hypertension No family hx of     Hyperlipidemia No family hx of     Cerebrovascular Disease No family hx of     Breast Cancer No family hx of     Colon Cancer No family hx of     Prostate Cancer No family hx of     Other Cancer No family hx of     Depression No family hx of     Anxiety Disorder No family hx of     Mental Illness No family hx of     Substance Abuse No family hx of     Anesthesia Reaction No family hx of     Asthma No family hx of     Osteoporosis No family hx of     Genetic Disorder No family hx of     Obesity No family hx of     Unknown/Adopted No family hx of      Social History     Socioeconomic History    Marital status:      Spouse name: None    Number of children: None    Years of education: None    Highest education level: None   Tobacco Use    Smoking status: Former     Types: Cigarettes     Quit date: 1997     Years since quittin.9    Smokeless tobacco: Never   Vaping Use     Vaping Use: Never used   Substance and Sexual Activity    Alcohol use: Yes     Comment: Rarely    Drug use: No    Sexual activity: Yes     Partners: Male   Social History Narrative    6/2022: lives with daughter, no pets, switched form teaching  to accounting now.         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, in between jobs, was a teacher      Social Determinants of Health     Financial Resource Strain: Low Risk  (11/3/2023)    Financial Resource Strain     Within the past 12 months, have you or your family members you live with been unable to get utilities (heat, electricity) when it was really needed?: No   Food Insecurity: Low Risk  (11/3/2023)    Food Insecurity     Within the past 12 months, did you worry that your food would run out before you got money to buy more?: No     Within the past 12 months, did the food you bought just not last and you didn t have money to get more?: No   Transportation Needs: Low Risk  (11/3/2023)    Transportation Needs     Within the past 12 months, has lack of transportation kept you from medical appointments, getting your medicines, non-medical meetings or appointments, work, or from getting things that you need?: No   Interpersonal Safety: Low Risk  (11/6/2023)    Interpersonal Safety     Do you feel physically and emotionally safe where you currently live?: Yes     Within the past 12 months, have you been hit, slapped, kicked or otherwise physically hurt by someone?: No     Within the past 12 months, have you been humiliated or emotionally abused in other ways by your partner or ex-partner?: No   Housing Stability: Low Risk  (11/3/2023)    Housing Stability     Do you have housing? : Yes     Are you worried about losing your housing?: No       Current Outpatient Medications:     folic acid-vit B6-vit B12 (FOLGARD) 0.8-10-0.115 MG TABS per tablet, Take by mouth daily, Disp: , Rfl:      Allergies   Allergen Reactions    Sulfa Antibiotics Rash       Past medical, surgical, social and family history  "were reviewed and updated in [facility].    EXAM:  /85   Pulse 80   Ht 1.676 m (5' 6\")   Wt 74.4 kg (164 lb)   LMP  (LMP Unknown)   SpO2 97%   BMI 26.47 kg/m     BMI: Body mass index is 26.47 kg/m .  Constitutional: healthy, alert and no distress  Head: Normocephalic. No masses, lesions, tenderness or abnormalities  Neck: Neck supple. Trachea midline. No adenopathy. Thyroid symmetric, normal size.   Cardiovascular: RRR.   Respiratory: Negative.   Breast: Breasts reveal mild symmetric fibrocystic densities, but there are no dominant, discrete, fixed or suspicious masses found.  Gastrointestinal: Abdomen soft, non-tender, non-distended. No masses, organomegaly  :  Vulva:  No external lesions, normal female hair distribution, no inguinal adenopathy.    Urethra:  Midline, non-tender, well supported, no discharge  Vagina:  Atrophic, no abnormal discharge, no lesions  Uterus:  Normal size , non-tender, freely mobile  Ovaries:  No masses appreciated, non-tender, mobile  Rectal Exam: deferred  Musculoskeletal: extremities normal  Skin: no suspicious lesions or rashes  Psychiatric: Affect appropriate, cooperative,mentation appears normal.     COUNSELING:   Reviewed preventive health counseling, as reflected in patient instructions       Osteoporosis prevention/bone health   reports that she quit smoking about 26 years ago. Her smoking use included cigarettes. She has never used smokeless tobacco.    Body mass index is 26.47 kg/m .      FRAX Risk Assessment  ASSESSMENT:  53 year old  with satisfactory annual exam  (Z01.419) Encounter for gynecological examination without abnormal finding  (primary encounter diagnosis)  Comment:    Plan: Given information to read about hormone therapy in case she desires and strongly recommended she get shingles vaccine.    (Z12.4) Pap smear for cervical cancer screening  Plan: Pap screen with HPV - recommended age 30 - 65         years        Discussed sending pap smear for " HPV typing as well and that if both pap and HPV are negative, then can have q5 year pap smears.  Kenzie Pablo MD

## 2023-12-06 ENCOUNTER — TRANSFERRED RECORDS (OUTPATIENT)
Dept: HEALTH INFORMATION MANAGEMENT | Facility: CLINIC | Age: 53
End: 2023-12-06
Payer: COMMERCIAL

## 2023-12-07 LAB
BKR LAB AP GYN ADEQUACY: NORMAL
BKR LAB AP GYN INTERPRETATION: NORMAL
BKR LAB AP HPV REFLEX: NORMAL
BKR LAB AP PREVIOUS ABNORMAL: NORMAL
PATH REPORT.COMMENTS IMP SPEC: NORMAL
PATH REPORT.COMMENTS IMP SPEC: NORMAL
PATH REPORT.RELEVANT HX SPEC: NORMAL

## 2023-12-11 LAB
HUMAN PAPILLOMA VIRUS 16 DNA: NEGATIVE
HUMAN PAPILLOMA VIRUS 18 DNA: NEGATIVE
HUMAN PAPILLOMA VIRUS FINAL DIAGNOSIS: NORMAL
HUMAN PAPILLOMA VIRUS OTHER HR: NEGATIVE

## 2023-12-14 ENCOUNTER — OFFICE VISIT (OUTPATIENT)
Dept: FAMILY MEDICINE | Facility: CLINIC | Age: 53
End: 2023-12-14
Payer: COMMERCIAL

## 2023-12-14 VITALS
WEIGHT: 160.07 LBS | HEIGHT: 67 IN | BODY MASS INDEX: 25.12 KG/M2 | TEMPERATURE: 97.7 F | DIASTOLIC BLOOD PRESSURE: 76 MMHG | HEART RATE: 71 BPM | OXYGEN SATURATION: 100 % | RESPIRATION RATE: 18 BRPM | SYSTOLIC BLOOD PRESSURE: 118 MMHG

## 2023-12-14 DIAGNOSIS — Z87.891 FORMER SMOKER: ICD-10-CM

## 2023-12-14 DIAGNOSIS — Z82.69 FAMILY HISTORY OF OSTEOPENIA: ICD-10-CM

## 2023-12-14 DIAGNOSIS — G44.219 EPISODIC TENSION-TYPE HEADACHE, NOT INTRACTABLE: ICD-10-CM

## 2023-12-14 DIAGNOSIS — Z78.9 VEGETARIAN: ICD-10-CM

## 2023-12-14 DIAGNOSIS — Z87.81 HISTORY OF RIB FRACTURE: Primary | ICD-10-CM

## 2023-12-14 DIAGNOSIS — M79.10 MYALGIA: ICD-10-CM

## 2023-12-14 DIAGNOSIS — Z13.21 ENCOUNTER FOR VITAMIN DEFICIENCY SCREENING: ICD-10-CM

## 2023-12-14 DIAGNOSIS — M16.11 ARTHRITIS OF RIGHT HIP: ICD-10-CM

## 2023-12-14 DIAGNOSIS — M25.50 ARTHRALGIA, UNSPECIFIED JOINT: ICD-10-CM

## 2023-12-14 DIAGNOSIS — Z23 NEED FOR SHINGLES VACCINE: ICD-10-CM

## 2023-12-14 DIAGNOSIS — Z71.89 ADVANCED DIRECTIVES, COUNSELING/DISCUSSION: ICD-10-CM

## 2023-12-14 DIAGNOSIS — Z23 NEED FOR COVID-19 VACCINE: ICD-10-CM

## 2023-12-14 DIAGNOSIS — Z12.31 VISIT FOR SCREENING MAMMOGRAM: ICD-10-CM

## 2023-12-14 DIAGNOSIS — Z00.00 HEALTH CARE MAINTENANCE: ICD-10-CM

## 2023-12-14 DIAGNOSIS — Z13.820 SCREENING FOR OSTEOPOROSIS: ICD-10-CM

## 2023-12-14 DIAGNOSIS — F43.9 SITUATIONAL STRESS: ICD-10-CM

## 2023-12-14 DIAGNOSIS — Z23 NEED FOR DIPHTHERIA-TETANUS-PERTUSSIS (TDAP) VACCINE: ICD-10-CM

## 2023-12-14 DIAGNOSIS — Z87.898 HISTORY OF SYNCOPE: ICD-10-CM

## 2023-12-14 DIAGNOSIS — M72.2 PLANTAR FASCIITIS: ICD-10-CM

## 2023-12-14 LAB
FERRITIN SERPL-MCNC: 14 NG/ML (ref 11–328)
IRON BINDING CAPACITY (ROCHE): 362 UG/DL (ref 240–430)
IRON SATN MFR SERPL: 19 % (ref 15–46)
IRON SERPL-MCNC: 69 UG/DL (ref 37–145)
VIT B12 SERPL-MCNC: 663 PG/ML (ref 232–1245)
VIT D+METAB SERPL-MCNC: 35 NG/ML (ref 20–50)

## 2023-12-14 PROCEDURE — 82607 VITAMIN B-12: CPT | Performed by: FAMILY MEDICINE

## 2023-12-14 PROCEDURE — 83550 IRON BINDING TEST: CPT | Performed by: FAMILY MEDICINE

## 2023-12-14 PROCEDURE — 82728 ASSAY OF FERRITIN: CPT | Performed by: FAMILY MEDICINE

## 2023-12-14 PROCEDURE — 99215 OFFICE O/P EST HI 40 MIN: CPT | Performed by: FAMILY MEDICINE

## 2023-12-14 PROCEDURE — 82306 VITAMIN D 25 HYDROXY: CPT | Performed by: FAMILY MEDICINE

## 2023-12-14 PROCEDURE — 36415 COLL VENOUS BLD VENIPUNCTURE: CPT | Performed by: FAMILY MEDICINE

## 2023-12-14 PROCEDURE — 83540 ASSAY OF IRON: CPT | Performed by: FAMILY MEDICINE

## 2023-12-14 RX ORDER — SUMATRIPTAN 100 MG/1
100 TABLET, FILM COATED ORAL
COMMUNITY
Start: 2023-08-21 | End: 2023-12-14

## 2023-12-14 RX ORDER — LACTOBACILLUS ACIDOPHILUS/PECT 30 MG-20MG
250 TABLET ORAL DAILY
COMMUNITY

## 2023-12-14 RX ORDER — VITAMIN B COMPLEX
1 TABLET ORAL DAILY
COMMUNITY

## 2023-12-14 ASSESSMENT — PAIN SCALES - GENERAL: PAINLEVEL: MILD PAIN (2)

## 2023-12-14 NOTE — PATIENT INSTRUCTIONS
History of right lateral mildly displaced sixth and eighth rib fractures as well as likely nondisplaced lateral right seventh rib fracture following a fall end of October while out of town and getting up off the toilet too fast. Fracture could be due to fragility versus something else, labs, dexa and mammogram ordered to evaluate more.  Recent labs 6/ 2023 to date normal in care everywhere namely negative for Lyme's in June, normal CRP RAHAT TSH CBC CMP HIV hep C lipids in September  Continue with supportive care, splinting using your hands to hold your rib cage while coughing or sneezing.  Has been avoiding a bra which causes some breast discomfort bilaterally like a pulling but no discomfort in the breast tissue itself breast exam by gynecology normal recently.  Continue with yoga stretching exercises may refer to physical therapy if not better.  May take a few months to get fully better.    History of near/ syncope causing fall in oct.  While vasovagal, (which means fainting), likely reason for fall in October causing rib fracture cannot rule out other causes.  May have been prodromal as diagnosed with COVID just 2 days later. No clear known loss of consciousness. Of note seen for headaches, & ct head and labs normal, in sept at [facility] & by neurology outside [place] and MRI brain for history of headaches earlier in October had been unremarkable. Today carotids and heart sound ok, vitals are stable , exam is benign and has had no recurrence.  Concern for seizures and a cardiac cause for falling seem low.  Will defer doing carotid ultrasound or echocardiogram at this time but recommend might be good to do a Zio patch to assess rhythm to complete syncopal workup.  Encouraged to stay well-hydrated and change posture slowly avoid straining on bowel movements or when emptying bladder..    Originally made appointment for muscle aches & joint pains.  Has had history of right hip osteoarthritis in the past,  bilateral trochanteric bursitis last year.  Was tested negative for Lyme's in June.  Lab work in September at [facility] revealed no autoimmune or medical concern.  When stressed and anxious muscles can get tight and can cause aches and pains.  Metabolism and normal physiologic changes in the body as 1 ages can also contribute to aches and pains as can some arthritis.  Continue with supportive care like ice and heat to affected areas as needed, over-the-counter pain creams like Arnica or lidocaine gel or diclofenac gel.  While ibuprofen is a good anti-inflammatory frequent use of this may increase risk of gastritis ulcers heart attack kidney problems.  Tylenol is a safe option to use for pain though is not a very good anti-inflammatory agent.  May try a teaspoon of honey a day.  And a low inflammatory diet low in simple carbs and stress management to help with pain.  Acupuncture is a good option as well as massage.  Continue with stretching exercises given by yoga therapist.    Planta fasciitis, recommend avoiding walking barefoot, wear stiff soled shoes, do some stretching like rolling foot on a frozen bottle of water daily.  Follow-up with your therapist as planned regarding this.    Episodic tension headache CT head and [facility] normal labs normal in August and September.  Seen by neurology and MRI head in October normal.  Unconscious clenching of jaw due to tension and stress can contribute to tension headaches.  Encouraged plenty of fluids limit screen time , antiglare glasses may be helpful, continue with magnesium and B6 which help prevent headaches.    Situational stress and encouraged to see a therapist as planned.    Mostly vegetarian is on supplements B6, B12, folate, D3, omega-3, EPA DHA and collagen.  Lab work recently all unremarkable we will just do some additional tests of iron ferritin B12 and vitamin D to make sure you are getting enough and not too much or too little.  As nutrient  deficiency may also contribute to aches and pains headaches etc.    Family history of osteopenia, mostly vegetarian long time, recent rib fracture.  Encourage screening for osteoporosis with a DEXA scan.  Encouraged to take calcium up to 1200 mg total a day via diet or supplements and vitamin D at least 2000 units daily.  Weightbearing exercises is helpful.  Recent screen at the chiropractor revealed decreased muscular mass . To continue with lean protein intake and crosstraining and some weightbearing exercises to help maintain muscle mass & prevent falls.    Former smoker quit long enough ago may does not need CT lung cancer screening.  Lungs are clear on exam.  Smoking history could have contributed to potential decreased bone mineral density.     Healthcare maintenance reviewed.  Consider working on healthcare directives.  Due for screening mammogram and orders in place to schedule once ribs feel better.  If continuing to have breast tenderness then may benefit from a diagnostic mammogram as opposed to screening mammograms will let us know but suspect currently the pulling on each side of the breast is more related to no longer wearing a bra due to recent rib fracture rather than breast tissue issue itself.  Reported breast exam at the gynecologist recently was normal.    Recommend updated COVID vaccination will get another day.  Discussed due for Tdap and will get another day.  Consider Shingrex. shingles vaccines a series of 2 shots 2 to 6 months apart that can cause couple days of flu like symptoms but is expensive so to check with insurance and get at pharmacy if cheaper there.  Colonoscopy in 2016 done for rectal bleed showed hemorrhoids otherwise no other findings has had no symptoms since recommend recheck in 2026.  Lipids done in September was normal.  BMI a little over 25.  Continue to eat healthy and stay active.  Follow-up as needed    The above note was dictated using voice recognition. Although  reviewed after completion, some word and grammatical error may remain .                 Diclofenac gel as needed  Ziopatch  Labs  Mammogram  Consider vaccines

## 2023-12-14 NOTE — PROGRESS NOTES
The below note was dictated using voice recognition. Although reviewed after completion, some word and grammatical error may remain.    Assessment & Plan     History of rib fracture  History of right lateral mildly displaced sixth and eighth rib fractures as well as likely nondisplaced lateral right seventh rib fracture following a fall end of October while out of town and getting up off the toilet too fast. Fracture could be due to fragility versus something else, labs, dexa and mammogram ordered to evaluate more. Recent labs 6/ 2023 to date normal in care everywhere namely negative, splinting using hands to hold right rib cage while coughing or sneezing.  Has been avoiding a bra which causes some breast discomfort bilaterally like a pulling but no discomfort in the breast tissue itself, breast exam by gynecology normal recently.  Continue with yoga stretching exercises & can refer to physical therapy if not better.  May take a few months to get fully better.    History of near syncope/ syncope causing fall in oct. While vasovagal likely reason for fall in October causing rib fracture cannot rule out other causes.  May have been prodromal as  diagnosed with COVID just 2 days later. No clear known loss of consciousness. Of note seen for headaches, & [facility] by [facility]y outside [place] and MRI brain for history of headaches in early October had been unremarkable. Today carotids and heart sound normal, vitals are stable, exam is benign and has had no recurrence.  Concern for seizures and a cardiac cause for falling seem low.  Will defer doing carotid ultrasound or echocardiogram at this time but recommend might be good to do a Zio patch to assess rhythm to complete syncopal workup.  Encouraged to stay well-hydrated and change posture slowly, avoid straining with bowel movements or when emptying bladder.    Originally made appointment for muscle aches & joint pains.   Has had history of right hip osteoarthritis in the past, bilateral trochanteric bursitis last year.  Was tested negative for Lyme's in June.  Lab work in September at [facility] revealed no autoimmune or medical concern.  When stressed and anxious muscles can get tight and can cause aches and pains.  Metabolism and normal physiologic changes in the body as one ages can also contribute to aches and pains as can some arthritis. Continue with supportive care like ice and heat to affected areas as needed, over-the-counter pain creams like Arnica or lidocaine gel or diclofenac gel.  While ibuprofen is a good anti-inflammatory frequent use of this may increase risk of gastritis/ ulcers, heart attack & kidney problems.  Tylenol is a safe option to use for pain though is not a very good anti-inflammatory agent.  May try a teaspoon of honey a day.  And a low inflammatory diet low in simple carb's and stress management to help with pain.  Acupuncture is a good option as well as massage.  Continue with stretching exercises given by yoga therapist.    Plantar fasciitis, recommend avoiding walking barefoot, wear stiff soled shoes, do some stretching like rolling foot on a frozen bottle of water daily.  Follow-up with her therapist as planned regarding this.    Episodic tension headaches, CT head in [facility],  normal labs in August and September & seen by neurology and MRI head in October reported normal.  Unconscious clenching of jaw due to tension and stress can contribute to tension headaches.  Encouraged plenty of fluids, limit screen time, antiglare glasses may be helpful, continue with magnesium and B 6 which can help prevent headaches.    Situational stress and encouraged to see a therapist as planned.    Mostly vegetarian , is on supplements B 6, B 12, folate, D 3, omega-3, EPA DHA and collagen.  Lab work recently all unremarkable, we will just do some additional tests of iron ferritin B 12 and vitamin D to  make sure  is getting enough and not too much or too little.  As nutrient deficiency may also contribute to aches and pains, headaches etc.    Family history of osteopenia, mostly vegetarian long time, recent rib fracture and was a former smoker. Encouraged screening for osteoporosis with a DEXA scan.  Encouraged to take calcium up to 1200 mg total a day via diet or supplements and vitamin D at least 2000 units daily.  Weightbearing exercises is helpful.  Recent screen at the chiropractor revealed decreased muscular mass & to continue with lean protein intake and cross training and some weightbearing exercises to help maintain muscle mass & prevent falls.    Former smoker quit long enough ago & does not need CT lung cancer screening.  Lungs are clear on exam. Smoking history could have contributed to potential decreased bone mineral density.     Healthcare maintenance reviewed.  Consider working on healthcare directives.  Due for screening mammogram and orders in place to schedule once ribs feel better.  If continuing to have breast tenderness then may benefit from a diagnostic mammogram as opposed to screening mammogram so to let us know but  I suspect currently the pulling sensation on each side of the breast is more related to no longer wearing a bra due to recent rib fracture rather than breast tissue issue itself. Reported breast exam at the gynecologist recently was normal and not examined today    Recommend updated COVID vaccination will get another day.  Discussed due for Tdap and will get another day.  Consider Shingrex. shingles vaccines a series of 2 shots 2 to 6 months apart that can cause couple days of flu like symptoms but is expensive so to check with insurance and get at pharmacy if cheaper there.  Colonoscopy in 2016 done for rectal bleed showed non bleeding hemorrhoids at the time, otherwise no other findings & has had no symptoms since then , recommend recheck in 2026.  Lipids done in September  was normal.  BMI a little over 25.  Continue to eat healthy and stay active.  Follow-up as needed, Next physical due fall of 2024  - Vitamin D Deficiency; Future  - DX Hip/Pelvis/Spine; Future  - Vitamin D Deficiency    History of syncope  History of near syncope/ syncope causing fall in oct. While vasovagal likely reason for fall in October causing rib fracture cannot rule out other causes.  May have been prodromal as  diagnosed with COVID just 2 days later. No clear known loss of consciousness. Of note seen for headaches, & ct head and labs normal in aug / sept at [facility] & by neurology outside [place] and MRI brain for history of headaches in early October had been unremarkable. Today carotids and heart sound normal, vitals are stable, exam is benign and has had no recurrence.  Concern for seizures and a cardiac cause for falling seem low.  Will defer doing carotid ultrasound or echocardiogram at this time but recommend might be good to do a Zio patch to assess rhythm to complete syncopal workup.  Encouraged to stay well-hydrated and change posture slowly, avoid straining with bowel movements or when emptying bladder.  - Iron and iron binding capacity; Future  - Ferritin; Future  - Vitamin B 12; Future  - Adult Leadless EKG Monitor 8 to 14 Days; Future  - Iron and iron binding capacity  - Ferritin  - Vitamin B 12    Arthralgia, unspecified joint  Arthritis of right hip  Myalgia  Originally made appointment for muscle aches & joint pains.  Has had history of right hip osteoarthritis in the past, bilateral trochanteric bursitis last year.  Was tested negative for Lyme's in June.  Lab work in September at [facility] revealed no autoimmune or medical concern.  When stressed and anxious muscles can get tight and can cause aches and pains.  Metabolism and normal physiologic changes in the body as one ages can also contribute to aches and pains as can some arthritis. Continue with supportive care like ice  and heat to affected areas as needed, over-the-counter pain creams like Arnica or lidocaine gel or diclofenac gel.  While ibuprofen is a good anti-inflammatory frequent use of this may increase risk of gastritis/ ulcers, heart attack & kidney problems.  Tylenol is a safe option to use for pain though is not a very good anti-inflammatory agent.  May try a teaspoon of honey a day.  And a low inflammatory diet low in simple carb's and stress management to help with pain.  Acupuncture is a good option as well as massage.  Continue with stretching exercises given by yoga therapist.  - Vitamin D Deficiency; Future  - Vitamin D Deficiency    Plantar fasciitis  Plantar fasciitis, recommend avoiding walking barefoot, wear stiff soled shoes, do some stretching like rolling foot on a frozen bottle of water daily.  Follow-up with her therapist as planned regarding this.  - Vitamin D Deficiency; Future  - Vitamin D Deficiency    Episodic tension-type headache, not intractable  Episodic tension headaches, CT head in [facility],  normal labs in August and September & seen by neurology and MRI head in October reported normal.  Unconscious clenching of jaw due to tension and stress can contribute to tension headaches.  Encouraged plenty of fluids, limit screen time, antiglare glasses may be helpful, continue with magnesium and B 6 which can help prevent headaches.  - Vitamin D Deficiency; Future  - Vitamin D Deficiency    Situational stress  Situational stress and encouraged to see a therapist as planned.    Vegetarian  Encounter for vitamin deficiency screening  Mostly vegetarian , is on supplements B 6, B 12, folate, D 3, omega-3, EPA DHA and collagen.  Lab work recently all unremarkable, we will just do some additional tests of iron ferritin B 12 and vitamin D to make sure  is getting enough and not too much or too little.  As nutrient deficiency may also contribute to aches and pains, headaches etc.  - Vitamin D Deficiency;  Future  - Iron and iron binding capacity; Future  - Ferritin; Future  - Vitamin B 12; Future  - Vitamin D Deficiency  - Iron and iron binding capacity  - Ferritin  - Vitamin B 12    Family history of osteopenia- Vitamin D Deficiency; Future  Screening for osteoporosis  Family history of osteopenia, mostly vegetarian long time, recent rib fracture and was a former smoker. Encouraged screening for osteoporosis with a DEXA scan.  Encouraged to take calcium up to 1200 mg total a day via diet or supplements and vitamin D at least 2000 units daily.  Weightbearing exercises is helpful.  Recent screen at the chiropractor revealed decreased muscular mass & to continue with lean protein intake and cross training and some weightbearing exercises to help maintain muscle mass & prevent falls.  - DX Hip/Pelvis/Spine; Future    Former smoker  Former smoker quit long enough ago & does not need CT lung cancer screening.  Lungs are clear on exam. Smoking history could have contributed to potential decreased bone mineral density.     Health care maintenance  Healthcare maintenance reviewed.  Consider working on healthcare directives.  Due for screening mammogram and orders in place to schedule once ribs feel better.  If continuing to have breast tenderness then may benefit from a diagnostic mammogram as opposed to screening mammogram so to let us know but  I suspect currently the pulling sensation on each side of the breast is more related to no longer wearing a bra due to recent rib fracture rather than breast tissue issue itself. Reported breast exam at the gynecologist recently was normal and not examined today  Recommend updated COVID vaccination will get another day.  Discussed due for Tdap and will get another day.  Consider Shingrex. shingles vaccines a series of 2 shots 2 to 6 months apart that can cause couple days of flu like symptoms but is expensive so to check with insurance and get at pharmacy if cheaper  there.  Colonoscopy in 2016 done for rectal bleed showed non bleeding hemorrhoids at the time, otherwise no other findings & has had no symptoms since then , recommend recheck in 2026.  Lipids done in September was normal.  BMI a little over 25.  Continue to eat healthy and stay active.  Follow-up as needed, Next physical due fall of 2024  - REVIEW OF HEALTH MAINTENANCE PROTOCOL ORDERS    Advanced directives, counseling/discussion  Consider working on healthcare directives.    Visit for screening mammogram  Due for screening mammogram and orders in place to schedule once ribs feel better.  If continuing to have breast tenderness then may benefit from a diagnostic mammogram as opposed to screening mammogram so to let us know but  I suspect currently the pulling sensation on each side of the breast is more related to no longer wearing a bra due to recent rib fracture rather than breast tissue issue itself. Reported breast exam at the gynecologist recently was normal and not examined today  - MA SCREENING DIGITAL BILAT - Future  (s+30); Future    Need for COVID-19 vaccine  Recommend updated COVID vaccination will get another day.    Need for diphtheria-tetanus-pertussis (Tdap) vaccine  Discussed due for Tdap and will get another day.    Need for shingles vaccine  Consider Shingrex. shingles vaccines a series of 2 shots 2 to 6 months apart that can cause couple days of flu like symptoms but is expensive so to check with insurance and get at pharmacy if cheaper there.    Review of prior external note(s) from - CareEverywhere information from [facility] reviewed  Review of external notes as documented elsewhere in note  Review of the result(s) of each unique test - diagnostics in Psychiatric and Care Everywhere  Independent interpretation of a test performed by another physician/other qualified health care professional (not separately reported) - PCP, gynecologist, neurology note brought in, evaluation by chiropractor  Diagnosis or  "treatment significantly limited by social determinants of health - been a while since I seen her, multiple providers outside [place] also taking care of for  Ordering of each unique test  > 50 min  minutes spent by me on the date of the encounter doing chart review, history and exam, documentation and further activities per the note     BMI:   Estimated body mass index is 25.42 kg/m  as calculated from the following:    Height as of this encounter: 1.69 m (5' 6.54\").    Weight as of this encounter: 72.6 kg (160 lb 1.1 oz).   Weight management plan: Discussed healthy diet and exercise guidelines    Regular exercise  See Patient Instructions    Reena Mcocy MD  [facility] ZACH Arias is a 53 year old, presenting for the following health issues:  Follow Up        2023     7:35 AM   Additional Questions   Roomed by Elana       History of Present Illness       Reason for visit:  To discuss health screenings and rib fractured check up.    She eats 4 or more servings of fruits and vegetables daily.She consumes 0 sweetened beverage(s) daily.She exercises with enough effort to increase her heart rate 10 to 19 minutes per day.  She exercises with enough effort to increase her heart rate 4 days per week.   She is taking medications regularly.     BACKGROUND  53-year-old  2 para 1-0-1-1, former smoker, mostly vegetarian, on supplements, resolved history of anemia  & iron deficiency in the past, noted to have had long-standing anemia reported diagnosed at age 5 and then chronically anemic lost track of it initially as a child, recalled being told was on fortified cereal, than not addressed until  was an adult and came up as a diagnosis, hx of irregular menstrual cycle, endometrial thickening on a prior pelvic ultrasound, hx of right hip arthritis & pain, bilateral trochanteric bursitis, myalgias arthralgias with negative autoimmune workup 2023, history of rib " fracture following fall November 2023, history of possible vasovagal syncope contributing to that fall November 2023, likely prodromal prior to development of COVID diagnosed couple days later, history of planta fasciitis, episodic tension type headaches, situational stress and therapy, family history of osteopenia, hx of eustachian tube dysfunction & chronic b/l tinnitus, b/l thumb pain hx of right foot pain in the past, hx of resolved pleuritis, hx of bright red blood per rectum status post colonoscopy which showed internal hemorrhoids non bleeding at the time in 2016, allergic to sulfa drugs, on B 6 to 50 mg, B 12 800 mcg, folate 133 3 mg, cod liver oil liquid 1 capful, vitamin D 3 1000 units, omega-3 2200 mg,  mg, DHEA 1250 mg, collagen peptide 17.5 g daily, under care of gynecologist Dr. Browning,    Seen for the first time November 6, 2018 for right lower quadrant pain, irregular menstrual bleeding, pelvic pain, rectal bleeding, alternating constipation and diarrhea, body aches, generalized weakness. Workup revealed a normal exam.  CBC showed mild anemia hemoglobin of 10.5 with microcytic hypochromic indices and low iron of 23, normal B 12, x-ray showed mild right hip arthritis worse since 2013 and CMP, lipids, TSH were normal and urine culture done for questionable UA was unremarkable. Pelvic U/S 11/9/18 showed an abnormally thickened endometrium with mild heterogeneity, measuring up to 22 mm &advised to consider OB/GYN consultation,  Seen 11/26/2018 & noted while had looked at the low fodmaps diet, did not notice any change but had not tried it long enough.  Wondered about a probiotic and seeing GI.  Declined mammogram as felt grandmother had breast cancer post menopause. Pelvic Pap up-to-date and due again in 2021.  Declined flu shot and HIV testing.  Noted that her insurance was changing and she had a new job to start in December 2018 and was to be  switching to [employer].  So declined any  referrals until  proceeded with the new insurance.  Had trouble taking iron supplements as they would make her constipation worse.  Plus being a vegetarian it was hard for her to get vegetarian iron supplements that worked for her.  Would be interested in seeing a hematologist to workup her symptoms in addition to seeing GYN and GI. These referrals were in place. Not seen elsewhere and lost to follow up  Seen by gyn 3/10/21 when no mention made of RLQ pain or cycle and seen for routine physical and pap noted not due then till 2023, declined mammogram and STI testing done due to presence of a new partner since last tested & gc, HIV and syphilis came back negative. Seen by dermatology 5/2022. Seen next in  on 5/14/22 for RLQ pain of reported 1 week at the time. Exam, UA, HCG, & CBC were normal. A pelvic u/s was done that was normal with mild heterogenous look to the uterine myometrium.    Seen 6/23/22 for right lower quadrant pain, felt was new since mid May 2022 but similar to 2018 when seen for similar symptoms. Exam, u/s , labs to date unremarkable. Differentials included musculoskeletal, versus hernia ( non efelt or seen)  versus appendix abscess( exam not suggestive of this)  versus worsening hip arthritis ( symptoms seemed higher up then where would expect groin pain) versus uterine causes versus constipation. Did not appear ill and an acute abdomen was not present. Unclear etiology/diagnosis with uncertain prognosis requiring further workup Desired due to cost to go in step wise fashion. Felt strongly it was uterine so to start with gyn first. & referral given. If no answer there to consider a ct abdomen. If no answer after that to investigate prior noted right hip arthritis as potential contributor to pain encouraged to return in 3 months for a physical but to contact us sooner if anything changed.  Labs done was negative for hepatitis C, with normal CBC, CMP, iron, ferritin of 12, B 12 and thyroid.  Later  also requested evaluation for autoimmune and ESR CRP RF and CCP ordered but did not get done.    Seen by gynecology 9/16 for right lower quadrant pain and pelvic pain and a pelvic ultrasound was ordered.  This did not get done.  Seen next at [facility] 6/2/2023 for bilateral hip pain x-rays were unremarkable diagnosed with trochanteric bursitis and advised physical therapy.  Lab work on 6/27/2023 at [facility] for Lyme's was negative. Did physical therapy at [facility] and discharged from therapy 7/17/2023.  Seen 8/21/2023 by [facility] for new onset episodic headache.  CT head was negative.  CBC, CRP was normal.  Diagnosed with tension headaches and also given Imitrex to try but did not start this.  Also discussed his intermittent transient generalized abdominal pain UA was negative and was advised to return for more evaluation if needed.  Seen next by Dr. Kishan Michelle at [facility] on 9/5/2023 for physical but mostly concerns or myalgias and arthralgias and concern for an autoimmune condition.  Lab work revealed normal testing with negative for celiac, normal CRP, normal RAHAT, normal TSH, normal CK, normal CBC, CMP, lipids HIV and hepatitis C testing.  Was referred to neurology.  As was worried about MS.  Seen by neurology outside care everywhere and MRI reported done 10/4/2023 and on note patient later forwarded to me showed nonspecific white matter findings, assessed to be normal and no further recommendations given.  End of October while outside the state on a vacation in a hotel at night getting up from the toilet had a fall resulting in right-sided rib pain seen in urgent care on 11/1 assessed to have couple mildly displaced lateral rib fractures and a nondisplaced rib fracture with no pneumothorax minimal pleural effusion and given supportive care and some Vicodin.  2 days later tested positive for COVID.  Seen by Dr. Bruce 11/6 for follow-up on fractures and advised to consider DEXA  scan.  Short-term disability and FMLA paperwork filled by Dr. Bruce.  Seen by gynecology 12/5 for annual physical noted menopausal since July 2022, no significant hot flashes declined need for medication Pap HPV done was normal.  Discussed HRT and shingles vaccine.    CURRENTLY   Here today in an office visit to discuss some concerns.  Noted in late October fractured ribs.  Is a follow-up on that as well as a few other health concerns.  Also originally had made an appointment for aches and pains that moved around had a lot of different tests in an outside system and would like me to look at them and learn what I see.  Things also has planta fasciitis right now working with a physical therapist and wants to know expectations for healing.    Notes was out of town end of October when got up to go to the bathroom and when got off the toilet to [place] had a near fainting episode and fell to the ground no known loss of consciousness no incontinence of urine or stool no seizure activity.  Was not straining at a bowel movement has never happened before did not have alcohol that day.  Was not feeling ill but reports few days later was diagnosed with COVID.  Seen in urgent care on 11/1 when noted had mildly displaced lateral right sixth and eighth rib fractures as well as likely nondisplaced lateral right seventh rib fracture with new small right pleural effusion no pneumothorax noted no pulmonary edema left pleural effusion or pneumonia.  Heart size was normal as noted some degenerative changes in the spine.  The day after seen in urgent care tested positive for COVID.  Also had a bruise right upper outer thigh that resolved on its own.  Recovered from COVID.  Is 7 weeks out from rib injury still has some mild limited mobility with twisting and reaching the right side and right arm.  Stopped wearing a bra as it was painful and now since not wearing a bra the size of her breasts feel a bit tender from being more dependent to  gravity.  Did have a gynecological exam recently and reports no breast lumps bumps skin changes or nipple discharge.  Is planning to schedule her mammogram once ribs feel better.  Did see Dr. Stephen soon after the urgent care visit and had disability and FMLA paperwork filled by that provider.  Has no problems breathing no rash or bruising noted.  Is seeing a yoga therapist and doing some stretching exercises and desires to hold off on physical therapy for ribs right now.  Is feeling pretty good.  Was advised to consider DEXA scan.  Not sure if this was a fragility fracture or something else.  Does have family history of osteopenia.  Seen by the chiropractor recently 2 weeks ago and an evaluation there revealed a low muscle mass.  Has been trying to eat more protein and has gained a little bit of weight.  BMI is 25.  Has been vegetarian 95% of the time sometimes supplements with meat cannot digest dairy so avoids.  Does take cod liver oil.  Prefers to use food to get nutrients when needed.  When noted to have the rib fractures started with bone broth and when anemic in the past was taking liver    Was seen few months ago at [facility] for a lot of aches and pains different times different spots.  Lab work did not reveal any autoimmune condition.  Reports no swelling redness or warmth rash or ulcers.  Would like to do more natural remedies and food as treatment.  Would be nice to do know what to target if there is something.  Wondering about topical things.  Has been icing as needed.    Plantar fasciitis seeing physical therapy discussed course in general.    Episodic tension headache CT head negative in [facility] lab normal in June and August September.  Note seen by neurologist MRI done in October was unremarkable.    Admits to being under a lot of current situational stress.  Was seeing a therapist then there was a gap and is planning to get a new one or connect with the prior 1 eventually soon.  Is  "safe.    Mostly vegetarian on supplements agreeable to checking vitamin D B and iron ferritin today which has not been checked recently.    Maternal aunts with history of low bone mineral density given recent rib fracture okay to do a DEXA scan to screen for osteoporosis    Former smoker based on how much smoked and when quit does not meet criteria for CT lung cancer screening.    Healthcare maintenance reviewed.  No healthcare directives on file and encouraged to work on healthcare directives.    Screening mammogram due, reports some breast pulling bilaterally lateral side since not wearing a bra due to rib fracture.  No breast lumps bumps skin changes nipple discharge reports gynecology did a exam recently last week and it was normal.    Colonoscopy done 2016 for rectal bleeding showed nonbleeding hemorrhoids otherwise normal and advise recheck in 10 years or 2026.    Discussed COVID vaccination, Tdap and shingles vaccine will consider these in the future.    Review of Systems   Constitutional, HEENT, cardiovascular, pulmonary, GI, , musculoskeletal, neuro, skin, endocrine and psych systems are negative, except as otherwise noted.      Objective    /76 (BP Location: Right arm, Patient Position: Sitting, Cuff Size: Adult Regular)   Pulse 71   Temp 97.7  F (36.5  C) (Temporal)   Resp 18   Ht 1.69 m (5' 6.54\")   Wt 72.6 kg (160 lb 1.1 oz)   LMP  (LMP Unknown)   SpO2 100%   Breastfeeding No   BMI 25.42 kg/m    Body mass index is 25.42 kg/m .  Physical Exam   GENERAL: healthy, alert and no distress  EYES: Eyes grossly normal to inspection, PERRL and conjunctivae and sclerae normal, glasses  HENT: ear canals and TM's normal, nose and mouth without ulcers or lesions  NECK: no adenopathy, no asymmetry, masses, or scars and thyroid normal to palpation  RESP: lungs clear to auscultation - no rales, rhonchi or wheezes, right-sided lateral rib cage minimal discomfort to pressure, no crepitus heard no bruising " noted  CV: regular rate and rhythm, normal S1 S2, no S3 or S4, no murmur, click or rub, no peripheral edema and peripheral pulses strong  ABDOMEN: soft, nontender, no hepatosplenomegaly, no masses and bowel sounds normal  MS: no gross musculoskeletal defects noted, no edema  SKIN: no suspicious lesions or rashes  NEURO: Normal strength and tone, mentation intact and speech normal  PSYCH: mentation appears normal, affect normal/bright    No results found for any visits on 12/14/23.  No results found for this or any previous visit (from the past 24 hour(s)).

## 2023-12-15 NOTE — RESULT ENCOUNTER NOTE
Hello -    Here are my comments about your recent results:  Normal vitamin B12, vitamin D, & iron levels,  The storage form of iron called ferritin is 14 this is on the low end of normal encouraged to increase iron intake   Please let us know if you have any questions or concerns.     Regards,  Reena Mccoy MD

## 2024-04-28 ENCOUNTER — OFFICE VISIT (OUTPATIENT)
Dept: URGENT CARE | Facility: URGENT CARE | Age: 54
End: 2024-04-28
Payer: COMMERCIAL

## 2024-04-28 VITALS
RESPIRATION RATE: 15 BRPM | TEMPERATURE: 97.3 F | HEIGHT: 66 IN | OXYGEN SATURATION: 98 % | DIASTOLIC BLOOD PRESSURE: 80 MMHG | SYSTOLIC BLOOD PRESSURE: 118 MMHG | HEART RATE: 59 BPM | WEIGHT: 160 LBS | BODY MASS INDEX: 25.71 KG/M2

## 2024-04-28 DIAGNOSIS — H60.12 CELLULITIS OF AURICLE OF LEFT EAR: Primary | ICD-10-CM

## 2024-04-28 DIAGNOSIS — S00.462A TICK BITE OF LEFT EAR, INITIAL ENCOUNTER: ICD-10-CM
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
## 2024-04-28 PROCEDURE — 99213 OFFICE O/P EST LOW 20 MIN: CPT | Performed by: INTERNAL MEDICINE

## 2024-04-28 RX ORDER — CEPHALEXIN 500 MG/1
500 CAPSULE ORAL 2 TIMES DAILY
Qty: 14 CAPSULE | Refills: 0 | Status: SHIPPED | OUTPATIENT
Start: 2024-04-28 | End: 2024-05-05

## 2024-04-28 NOTE — PROGRESS NOTES
"  Assessment & Plan     Cellulitis of auricle of left ear  - cephALEXin (KEFLEX) 500 MG capsule; Take 1 capsule (500 mg) by mouth 2 times daily for 7 days    Tick bite of left ear, initial encounter  No worries regarding Lyme; cellulitis related to standard staph or strep.    Subjective   Juana is a 53 year old, presenting for the following health issues:  Urgent Care (Tick bite on left ear, woke up with it today. Regular tick. )    HPI   Chief complaint of a tick bite.  She found the tick attached to her ear five days ago.  She has the tick with her in a jar.  She removed the tick.  Now the ear is more swollen, red and painful.    Review of Systems  ROS:  The following systems have been completely reviewed and are negative except as noted in the HPI: CONSTITUTIONAL, HEAD AND NECK, DERMATOLOGIC, and MUSCULOSKELETAL       Objective    /80   Pulse 59   Temp 97.3  F (36.3  C) (Temporal)   Resp 15   Ht 1.676 m (5' 6\")   Wt 72.6 kg (160 lb)   LMP  (LMP Unknown)   SpO2 98%   BMI 25.82 kg/m    Body mass index is 25.82 kg/m .  Physical Exam   GENERAL: alert and no distress  HEENT: redness and swelling of the external ear along the posterior helix with some posterior auricular lymphadenopathy     The tick which bit the patient is an adult wood tick        Signed Electronically by: Efe Irizarry MD    "

## 2024-10-10 ENCOUNTER — LAB (OUTPATIENT)
Dept: LAB | Facility: CLINIC | Age: 54
End: 2024-10-10
Payer: COMMERCIAL

## 2024-10-10 DIAGNOSIS — Z78.9 VEGETARIAN: Primary | ICD-10-CM

## 2024-10-10 PROCEDURE — 36415 COLL VENOUS BLD VENIPUNCTURE: CPT

## 2024-10-10 PROCEDURE — 80061 LIPID PANEL: CPT

## 2024-10-11 LAB
CHOLEST SERPL-MCNC: 222 MG/DL
FASTING STATUS PATIENT QL REPORTED: NO
HDLC SERPL-MCNC: 85 MG/DL
LDLC SERPL CALC-MCNC: 114 MG/DL
NONHDLC SERPL-MCNC: 137 MG/DL
TRIGL SERPL-MCNC: 117 MG/DL

## 2024-10-11 NOTE — RESULT ENCOUNTER NOTE
Hello -    Here are my comments about your recent results:  Someone must have released my prior order I saw this lipid result this morning.  LDL is minimally elevated at 114 rest of lipids are normal.    The 10-year ASCVD risk score (Clay HARLEY, et al., 2019) is: 1.1%    Values used to calculate the score:      Age: 53 years      Sex: Female      Is Non- : No      Diabetic: No      Tobacco smoker: No      Systolic Blood Pressure: 118 mmHg      Is BP treated: No      HDL Cholesterol: 85 mg/dL      Total Cholesterol: 222 mg/dL  Overall cardiovascular risk is low continue with eating healthy staying active if have any more questions please make an appointment to discuss    Please let us know if you have any questions or concerns.     Regards,  Reena Mccoy MD

## 2024-11-14 ENCOUNTER — ANCILLARY PROCEDURE (OUTPATIENT)
Dept: BONE DENSITY | Facility: CLINIC | Age: 54
End: 2024-11-14
Attending: FAMILY MEDICINE
Payer: COMMERCIAL

## 2024-11-14 DIAGNOSIS — Z82.69 FAMILY HISTORY OF OSTEOPENIA: ICD-10-CM

## 2024-11-14 DIAGNOSIS — Z13.820 SCREENING FOR OSTEOPOROSIS: ICD-10-CM

## 2024-11-14 DIAGNOSIS — Z87.81 HISTORY OF RIB FRACTURE: ICD-10-CM

## 2024-11-14 PROCEDURE — 77080 DXA BONE DENSITY AXIAL: CPT | Performed by: INTERNAL MEDICINE

## 2024-11-14 NOTE — RESULT ENCOUNTER NOTE
Hello -    Here are my comments about your recent results:  Dexa scan shows normal bone mineral density.  Continue with calcium and vitamin D staying active and recheck in 5 years    Please let us know if you have any questions or concerns.     Regards,  Reena Mccoy MD

## 2024-11-21 ENCOUNTER — ANCILLARY PROCEDURE (OUTPATIENT)
Dept: MAMMOGRAPHY | Facility: CLINIC | Age: 54
End: 2024-11-21
Attending: INTERNAL MEDICINE
Payer: COMMERCIAL

## 2024-11-21 DIAGNOSIS — Z12.31 VISIT FOR SCREENING MAMMOGRAM: ICD-10-CM

## 2025-01-11 ENCOUNTER — HEALTH MAINTENANCE LETTER (OUTPATIENT)
Age: 55
End: 2025-01-11

## 2025-03-26 SDOH — HEALTH STABILITY: PHYSICAL HEALTH: ON AVERAGE, HOW MANY MINUTES DO YOU ENGAGE IN EXERCISE AT THIS LEVEL?: 30 MIN

## 2025-03-26 SDOH — HEALTH STABILITY: PHYSICAL HEALTH: ON AVERAGE, HOW MANY DAYS PER WEEK DO YOU ENGAGE IN MODERATE TO STRENUOUS EXERCISE (LIKE A BRISK WALK)?: 4 DAYS

## 2025-03-26 ASSESSMENT — SOCIAL DETERMINANTS OF HEALTH (SDOH): HOW OFTEN DO YOU GET TOGETHER WITH FRIENDS OR RELATIVES?: ONCE A WEEK

## 2025-03-27 ENCOUNTER — OFFICE VISIT (OUTPATIENT)
Dept: FAMILY MEDICINE | Facility: CLINIC | Age: 55
End: 2025-03-27
Payer: COMMERCIAL

## 2025-03-27 VITALS
RESPIRATION RATE: 22 BRPM | BODY MASS INDEX: 28.12 KG/M2 | OXYGEN SATURATION: 98 % | WEIGHT: 168.8 LBS | DIASTOLIC BLOOD PRESSURE: 70 MMHG | HEART RATE: 65 BPM | SYSTOLIC BLOOD PRESSURE: 110 MMHG | HEIGHT: 65 IN | TEMPERATURE: 97.1 F

## 2025-03-27 DIAGNOSIS — M79.10 MYALGIA: ICD-10-CM

## 2025-03-27 DIAGNOSIS — Z23 NEED FOR IMMUNIZATION AGAINST INFLUENZA: ICD-10-CM

## 2025-03-27 DIAGNOSIS — Z00.00 HEALTH CARE MAINTENANCE: ICD-10-CM

## 2025-03-27 DIAGNOSIS — Z87.81 HISTORY OF RIB FRACTURE: ICD-10-CM

## 2025-03-27 DIAGNOSIS — Z71.89 ADVANCED DIRECTIVES, COUNSELING/DISCUSSION: ICD-10-CM

## 2025-03-27 DIAGNOSIS — Z78.9 VEGETARIAN: ICD-10-CM

## 2025-03-27 DIAGNOSIS — M72.2 PLANTAR FASCIITIS: ICD-10-CM

## 2025-03-27 DIAGNOSIS — Z12.4 CERVICAL CANCER SCREENING: ICD-10-CM

## 2025-03-27 DIAGNOSIS — Z23 NEED FOR PNEUMOCOCCAL VACCINATION: ICD-10-CM

## 2025-03-27 DIAGNOSIS — Z00.00 ROUTINE HISTORY AND PHYSICAL EXAMINATION OF ADULT: Primary | ICD-10-CM

## 2025-03-27 DIAGNOSIS — Z23 NEED FOR SHINGLES VACCINE: ICD-10-CM

## 2025-03-27 DIAGNOSIS — Z87.42 HISTORY OF POSTMENOPAUSAL BLEEDING: ICD-10-CM

## 2025-03-27 DIAGNOSIS — H93.13 TINNITUS, BILATERAL: ICD-10-CM

## 2025-03-27 DIAGNOSIS — Z87.891 FORMER SMOKER: ICD-10-CM

## 2025-03-27 DIAGNOSIS — Z13.1 SCREENING FOR DIABETES MELLITUS: ICD-10-CM

## 2025-03-27 DIAGNOSIS — Z13.21 ENCOUNTER FOR VITAMIN DEFICIENCY SCREENING: ICD-10-CM

## 2025-03-27 DIAGNOSIS — Z23 NEED FOR DIPHTHERIA-TETANUS-PERTUSSIS (TDAP) VACCINE: ICD-10-CM

## 2025-03-27 DIAGNOSIS — R92.333 HETEROGENEOUSLY DENSE TISSUE OF BOTH BREASTS ON MAMMOGRAPHY: ICD-10-CM

## 2025-03-27 DIAGNOSIS — G44.219 EPISODIC TENSION-TYPE HEADACHE, NOT INTRACTABLE: ICD-10-CM

## 2025-03-27 DIAGNOSIS — Z82.62 FAMILY HISTORY OF OSTEOPOROSIS: ICD-10-CM

## 2025-03-27 DIAGNOSIS — R10.31 ABDOMINAL PAIN, RIGHT LOWER QUADRANT: ICD-10-CM

## 2025-03-27 PROBLEM — M25.50 ARTHRALGIA, UNSPECIFIED JOINT: Status: RESOLVED | Noted: 2023-12-14 | Resolved: 2025-03-27

## 2025-03-27 PROBLEM — F43.9 SITUATIONAL STRESS: Status: RESOLVED | Noted: 2023-12-14 | Resolved: 2025-03-27

## 2025-03-27 LAB
ALBUMIN SERPL BCG-MCNC: 4.1 G/DL (ref 3.5–5.2)
ALBUMIN UR-MCNC: NEGATIVE MG/DL
ALP SERPL-CCNC: 79 U/L (ref 40–150)
ALT SERPL W P-5'-P-CCNC: 13 U/L (ref 0–50)
ANION GAP SERPL CALCULATED.3IONS-SCNC: 12 MMOL/L (ref 7–15)
APPEARANCE UR: CLEAR
AST SERPL W P-5'-P-CCNC: 23 U/L (ref 0–45)
BASOPHILS # BLD AUTO: 0.1 10E3/UL (ref 0–0.2)
BASOPHILS NFR BLD AUTO: 1 %
BILIRUB SERPL-MCNC: 0.2 MG/DL
BILIRUB UR QL STRIP: NEGATIVE
BUN SERPL-MCNC: 11.7 MG/DL (ref 6–20)
CALCIUM SERPL-MCNC: 9.3 MG/DL (ref 8.8–10.4)
CHLORIDE SERPL-SCNC: 104 MMOL/L (ref 98–107)
COLOR UR AUTO: YELLOW
CREAT SERPL-MCNC: 0.79 MG/DL (ref 0.51–0.95)
EGFRCR SERPLBLD CKD-EPI 2021: 88 ML/MIN/1.73M2
EOSINOPHIL # BLD AUTO: 0.1 10E3/UL (ref 0–0.7)
EOSINOPHIL NFR BLD AUTO: 1 %
ERYTHROCYTE [DISTWIDTH] IN BLOOD BY AUTOMATED COUNT: 12.6 % (ref 10–15)
EST. AVERAGE GLUCOSE BLD GHB EST-MCNC: 108 MG/DL
FERRITIN SERPL-MCNC: 30 NG/ML (ref 11–328)
GLUCOSE SERPL-MCNC: 108 MG/DL (ref 70–99)
GLUCOSE UR STRIP-MCNC: NEGATIVE MG/DL
HBA1C MFR BLD: 5.4 % (ref 0–5.6)
HCO3 SERPL-SCNC: 25 MMOL/L (ref 22–29)
HCT VFR BLD AUTO: 41.7 % (ref 35–47)
HGB BLD-MCNC: 13.8 G/DL (ref 11.7–15.7)
HGB UR QL STRIP: NEGATIVE
IMM GRANULOCYTES # BLD: 0 10E3/UL
IMM GRANULOCYTES NFR BLD: 0 %
IRON BINDING CAPACITY (ROCHE): 317 UG/DL (ref 240–430)
IRON SATN MFR SERPL: 28 % (ref 15–46)
IRON SERPL-MCNC: 89 UG/DL (ref 37–145)
KETONES UR STRIP-MCNC: NEGATIVE MG/DL
LEUKOCYTE ESTERASE UR QL STRIP: NEGATIVE
LYMPHOCYTES # BLD AUTO: 1.9 10E3/UL (ref 0.8–5.3)
LYMPHOCYTES NFR BLD AUTO: 34 %
MCH RBC QN AUTO: 29.2 PG (ref 26.5–33)
MCHC RBC AUTO-ENTMCNC: 33.1 G/DL (ref 31.5–36.5)
MCV RBC AUTO: 88 FL (ref 78–100)
MONOCYTES # BLD AUTO: 0.4 10E3/UL (ref 0–1.3)
MONOCYTES NFR BLD AUTO: 7 %
NEUTROPHILS # BLD AUTO: 3.2 10E3/UL (ref 1.6–8.3)
NEUTROPHILS NFR BLD AUTO: 57 %
NITRATE UR QL: NEGATIVE
PH UR STRIP: 7 [PH] (ref 5–7)
PLATELET # BLD AUTO: 276 10E3/UL (ref 150–450)
POTASSIUM SERPL-SCNC: 4.6 MMOL/L (ref 3.4–5.3)
PROT SERPL-MCNC: 6.8 G/DL (ref 6.4–8.3)
RBC # BLD AUTO: 4.73 10E6/UL (ref 3.8–5.2)
SODIUM SERPL-SCNC: 141 MMOL/L (ref 135–145)
SP GR UR STRIP: 1.01 (ref 1–1.03)
TSH SERPL DL<=0.005 MIU/L-ACNC: 1.92 UIU/ML (ref 0.3–4.2)
UROBILINOGEN UR STRIP-ACNC: 0.2 E.U./DL
VIT B12 SERPL-MCNC: 467 PG/ML (ref 232–1245)
VIT D+METAB SERPL-MCNC: 34 NG/ML (ref 20–50)
WBC # BLD AUTO: 5.7 10E3/UL (ref 4–11)

## 2025-03-27 ASSESSMENT — PAIN SCALES - GENERAL: PAINLEVEL_OUTOF10: MODERATE PAIN (5)

## 2025-03-27 NOTE — PATIENT INSTRUCTIONS
Patient Education   Preventive Care Advice   This is general advice given by our system to help you stay healthy. However, your care team may have specific advice just for you. Please talk to your care team about your preventive care needs.  Nutrition  Eat 5 or more servings of fruits and vegetables each day.  Try wheat bread, brown rice and whole grain pasta (instead of white bread, rice, and pasta).  Get enough calcium and vitamin D. Check the label on foods and aim for 100% of the RDA (recommended daily allowance).  Lifestyle  Exercise at least 150 minutes each week  (30 minutes a day, 5 days a week).  Do muscle strengthening activities 2 days a week. These help control your weight and prevent disease.  No smoking.  Wear sunscreen to prevent skin cancer.  Have a dental exam and cleaning every 6 months.  Yearly exams  See your health care team every year to talk about:  Any changes in your health.  Any medicines your care team has prescribed.  Preventive care, family planning, and ways to prevent chronic diseases.  Shots (vaccines)   HPV shots (up to age 26), if you've never had them before.  Hepatitis B shots (up to age 59), if you've never had them before.  COVID-19 shot: Get this shot when it's due.  Flu shot: Get a flu shot every year.  Tetanus shot: Get a tetanus shot every 10 years.  Pneumococcal, hepatitis A, and RSV shots: Ask your care team if you need these based on your risk.  Shingles shot (for age 50 and up)  General health tests  Diabetes screening:  Starting at age 35, Get screened for diabetes at least every 3 years.  If you are younger than age 35, ask your care team if you should be screened for diabetes.  Cholesterol test: At age 39, start having a cholesterol test every 5 years, or more often if advised.  Bone density scan (DEXA): At age 50, ask your care team if you should have this scan for osteoporosis (brittle bones).  Hepatitis C: Get tested at least once in your life.  STIs (sexually  transmitted infections)  Before age 24: Ask your care team if you should be screened for STIs.  After age 24: Get screened for STIs if you're at risk. You are at risk for STIs (including HIV) if:  You are sexually active with more than one person.  You don't use condoms every time.  You or a partner was diagnosed with a sexually transmitted infection.  If you are at risk for HIV, ask about PrEP medicine to prevent HIV.  Get tested for HIV at least once in your life, whether you are at risk for HIV or not.  Cancer screening tests  Cervical cancer screening: If you have a cervix, begin getting regular cervical cancer screening tests starting at age 21.  Breast cancer scan (mammogram): If you've ever had breasts, begin having regular mammograms starting at age 40. This is a scan to check for breast cancer.  Colon cancer screening: It is important to start screening for colon cancer at age 45.  Have a colonoscopy test every 10 years (or more often if you're at risk) Or, ask your provider about stool tests like a FIT test every year or Cologuard test every 3 years.  To learn more about your testing options, visit:   .  For help making a decision, visit:   https://bit.ly/np81875.  Prostate cancer screening test: If you have a prostate, ask your care team if a prostate cancer screening test (PSA) at age 55 is right for you.  Lung cancer screening: If you are a current or former smoker ages 50 to 80, ask your care team if ongoing lung cancer screenings are right for you.  For informational purposes only. Not to replace the advice of your health care provider. Copyright   2023 [employer]. All rights reserved. Clinically reviewed by the [facility] Transitions Program. [employer] [id number] - REV 01/24.

## 2025-03-27 NOTE — PROGRESS NOTES
The below note was dictated using voice recognition. Although reviewed after completion, some word and grammatical error may remain .       Preventive Care Visit  [facility]  Reena Mccoy MD, Family Medicine  Mar 27, 2025      Assessment & Plan     Routine history and physical examination of adult  Seen today for a physical.     Breast no symptoms, exam normal, reports occasional right breast tenderness, similar to before like a pulling sensation, no pattern noted, is post menopausal 3 yrs, no symptoms currently, reports no difference with wearing different bras on or with no bra.  Mammogram done 11/21/2024 showed heterogeneously dense breasts, which may obscure small masses.There was no radiographic evidence of malignancy. FH of deshaun saab with breast cancer dx in her late 70 to 80's.  NEIL score 16.4% lifetime risk compared to population lifetime risk 8.4% but below 20% age-adjusted lifetime risk. Does have dense breasts may consider yearly MRI in addition to yearly mammogram if MRI not possible consider contrast-enhanced mammogram or molecular breast imaging if this does not possible consider ultrasound.   Continue annual mammograms & with regular self checks Could consider  consult in the future if desired if covered by insurance.    Pap HPV due in 2028.  Noted remote history of abnormal Pap age 23 that resolved,  in our system we have Pap results since 2013 noted normal, 2013 NILM & then 2018 age 47 NILM, neg HPV, From visit on 4/10/18: due to report of remote abnormal pap was advised Co-test 4/2023. On 12/5/23 NIL, Neg HPV. & advised 5 yr co-test ( 2028)    In October 2024 noted right lower abdominal pain above the inguinal ligament that lasted half a day.  Not as long as previous when told had a possible burst ovarian cyst.  Also saw the tiniest amount of pink color on wiping on the tissue paper.  It occurred once after intercourse and once without.  Has had no bleeding or  spotting since October 2024 just the 2 times.  Of note pelvic ultrasound in 2018 for the right lower abdominal pain had shown mildly abnormal thickened endometrium and mild heterogeneity, some subendometrial cysts along with enlarged bulbous uterus that could be seen with adenomyosis & was referred to OB/GYN at that time.  Records not available from that time, may have been seen at [facility]. In 2022 had a pelvic ultrasound again due to right lower quadrant pain and it showed mildly heterogenous uterine myometrium but otherwise normal.  X-rays in 2023 did not point towards hip arthritis.  Reports a history of irritable bowel syndrome but feels those symptoms are different from the right lower abdominal pain she experienced in October and previously.  Denies constipation.  Colonoscopy in 2016 had just shown some hemorrhoids.  Reports a history of cervical polyps in the past. There is family history of underactive thyroid. Has had no significant hot flashes or vaginal dryness that is bothersome. There is family history of endometrial cancer in mother. Noted she has an appointment to see a gynecologist coming up next week.  Discussed since postmenopausal 3 years approximately and given history of this intermittent recurrent right lower quadrant pain and spotting & a family history of endometrial cancer ,it would be wise to get a pelvic ultrasound. Will defer pelvic exam to upcoming GYN visit. She may plan to get the ultrasound and reschedule the Gyn visit after the results are in. Currently has no symptoms. Agreeable to lab work today.    So far labs showed normal CBC and urine analysis.      Prior noted muscle aches and joint pains resolved.  No longer with bilateral trochanteric bursitis.  Workup in 2023 at [facility] showed negative Lyme's and autoimmune testing. Plantar fasciitis got better.  No longer on collagen supplements.    Hx of episodic tension headaches, CT head for this with labs in August and  September 2023 at [facility] had been normal & seen by neurology and an MRI head done in October 2023 reported normal. No associated vision changes, nausea, vomiting. no tingling or numbness or weakness or gait issues.  Feels a dull headache top of head not quite every day but always there.  Has not really been tracking it.  Tends to avoid coffee but does drink some green tea rare black tea and decaf coffee and chocolate.  Has not associated any symptoms with or without caffeine.  Reports no allergies or nasal congestion.  Prior noted vasovagal syncope resulting in a fall in October 2023 has not reoccurred.  When seen in December 2023 it was suspected likely prodromal due to COVID diagnosed 2 days later.  Exam has been unremarkable in the past in terms of carotid and heart and concern for seizures and a cardiac cause of falling was low.  Feels wakes up refreshed in general, no known snoring.  Feels is well-hydrated.  And desk ergonomics have been checked.  Prior situational stress noted in 2023 has resolved. Notes no jaw issues, & does not grind teeth. Discussed keeping a headache diary to see if there are any triggers and could consider sleep study if no answers to rule out sleep apnea.  Encouraged antiglare glasses at work, regular eye checks, encouraged good hydration, and we will see what lab work shows today.    Hx of B/l tinnitus since her late 30's, mri brain done for headaches by neurology in October 2023 was reported normal, reports no problems hearing & currently declines need to see ENT or audiology at this time.  Encouraged to stay well-hydrated, sound protection, decrease salt intake & ginkgo may or may not be helpful    BMI 28 encouraged regular aerobic activity and healthy eating and will check lab work today.  So far hemoglobin A1c is normal    Mostly vegetarian, is on supplements B 6, B 12, folate, D 3, mineral supplement with magnesium in it, omega-3, & vit D. Will check labs today to make  sure there is no nutrient deficiency.    History of right lateral mildly displaced sixth and eighth rib fractures as well as likely nondisplaced lateral right seventh rib fracture following a fall end of October 2023 while out of town and getting up off the toilet too fast.  Suspected due to being vasovagal from prodrome as 2 days later was diagnosed with COVID.  Does have family history of osteopenia, sister who is 1 year older than her recently got diagnosed with osteoporosis & is a former smoker & postmenopausal. DEXA scan done 11/14/2024 showed normal bone mineral density and encouraged to take calcium 1200 mg total a day to diet and supplements and vitamin D at least 1000 units daily and continued weightbearing exercises and recheck in 5 years which would be 2029.      Former smoker, based on hx does not meet criteria for CT lung cancer screening.       Healthcare maintenance reviewed.   To consider working on healthcare directives.  Mammogram yearly, consider MRI yearly,  Consider  referral given family history of breast cancer.  Pap HPV due 2028.  Colonoscopy in 2016 done for rectal bleed showed non bleeding hemorrhoids at the time, otherwise no other findings & had had no symptoms since then  & recommended  recheck in 2026.  DEXA due 2029  Vaccines reviewed.  Declines the flu shot.  Up-to-date with the COVID-vaccine.  To consider Prevnar 20 and another day.  To consider Tdap another day.  To consider Shingrex. shingles vaccines a series of 2 shots 2 to 6 months apart that can cause couple days of flu like symptoms but is expensive so to check with insurance and get at pharmacy if cheaper there.  Will do lab work today and make further recommendations once reviewed.  Will not check cholesterol as that was checked in October 2024 LDL was 114 ASCVD risk 1.1% and encouraged healthy eating and regular aerobic activity.  Return in 1 year for preventive physical and sooner in an office visit for any new  concerns  - Hemoglobin A1c; Future  - Vitamin B12; Future  - Vitamin D Deficiency; Future  - PRIMARY CARE FOLLOW-UP SCHEDULING; Future  - Adult Dermatology  Referral; Future  - Hemoglobin A1c  - Vitamin B12  - Vitamin D Deficiency    Heterogeneously dense tissue of both breasts on mammography  Breast no symptoms, exam normal, reports occasional right breast tenderness, similar to before like a pulling sensation, no pattern noted, is post menopausal 3 yrs, no symptoms currently, reports no difference with wearing different bras on or with no bra.  Mammogram done 11/21/2024 showed heterogeneously dense breasts, which may obscure small masses.There was no radiographic evidence of malignancy. FH of deshaun saab with breast cancer dx in her late 70 to 80's.  NEIL score 16.4% lifetime risk compared to population lifetime risk 8.4% but below 20% age-adjusted lifetime risk. Does have dense breasts may consider yearly MRI in addition to yearly mammogram if MRI not possible consider contrast-enhanced mammogram or molecular breast imaging if this does not possible consider ultrasound.   Continue annual mammograms & with regular self checks Could consider  consult in the future if desired if covered by insurance.    Cervical cancer screening  Pap HPV due in 2028.  Noted remote history of abnormal Pap age 23 that resolved,  in our system we have Pap results since 2013 noted normal, 2013 NILM & then 2018 age 47 NILM, neg HPV, From visit on 4/10/18: due to report of remote abnormal pap was advised Co-test 4/2023. On 12/5/23 NIL, Neg HPV. & advised 5 yr co-test ( 2028)    History of postmenopausal bleeding  Abdominal pain, right lower quadrant  In October 2024 noted right lower abdominal pain above the inguinal ligament that lasted half a day.  Not as long as previous when told had a possible burst ovarian cyst.  Also saw the tiniest amount of pink color on wiping on the tissue paper.  It occurred once after  intercourse and once without.  Has had no bleeding or spotting since October 2024 just the 2 times.  Of note pelvic ultrasound in 2018 for the right lower abdominal pain had shown mildly abnormal thickened endometrium and mild heterogeneity, some subendometrial cysts along with enlarged bulbous uterus that could be seen with adenomyosis & was referred to OB/GYN at that time.  Records not available from that time, may have been seen at [facility]. In 2022 had a pelvic ultrasound again due to right lower quadrant pain and it showed mildly heterogenous uterine myometrium but otherwise normal.  X-rays in 2023 did not point towards hip arthritis.  Reports a history of irritable bowel syndrome but feels those symptoms are different from the right lower abdominal pain she experienced in October and previously.  Denies constipation.  Colonoscopy in 2016 had just shown some hemorrhoids.  Reports a history of cervical polyps in the past. There is family history of underactive thyroid. Has had no significant hot flashes or vaginal dryness that is bothersome. There is family history of endometrial cancer in mother. Noted she has an appointment to see a gynecologist coming up next week.  Discussed since postmenopausal 3 years approximately and given history of this intermittent recurrent right lower quadrant pain and spotting & a family history of endometrial cancer ,it would be wise to get a pelvic ultrasound. Will defer pelvic exam to upcoming GYN visit. She may plan to get the ultrasound and reschedule the Gyn visit after the results are in. Currently has no symptoms. Agreeable to lab work today.  So far CBC and UA is normal  - CBC with Platelets & Differential; Future  - TSH with free T4 reflex; Future  - Iron and iron binding capacity; Future  - Ferritin; Future  - US Pelvic Complete with Transvaginal; Future  - UA Macroscopic with reflex to Microscopic and Culture - Lab Collect; Future  - CBC with Platelets &  Differential  - TSH with free T4 reflex  - Iron and iron binding capacity  - Ferritin  - UA Macroscopic with reflex to Microscopic and Culture - Lab Collect  - Comprehensive metabolic panel; Future    Myalgia  Plantar fasciitis  Prior noted muscle aches and joint pains resolved.  No longer with bilateral trochanteric bursitis.  [facility] and [facility]. Plantar fasciitis got better.  No longer on collagen supplements.    Episodic tension-type headache, not intractable  Hx of episodic tension headaches, CT head for this with labs in August and September [facility] had been normal & seen by neurology and an MRI head done in October 2023 reported normal. No associated vision changes, nausea, vomiting. no tingling or numbness or weakness or gait issues.  Feels a dull headache top of head not quite every day but always there.  Has not really been tracking it.  Tends to avoid coffee but does drink some green tea rare black tea and decaf coffee and chocolate.  Has not associated any symptoms with or without caffeine.  Reports no allergies or nasal congestion.  Prior noted vasovagal syncope resulting in a fall in October 2023 has not reoccurred.  When seen in December 2023 it was suspected likely prodromal due to COVID diagnosed 2 days later.  Exam has been unremarkable in the past in terms of carotid and heart and concern for seizures and a cardiac cause of falling was low.  Feels wakes up refreshed in general, no known snoring.  Feels is well-hydrated.  And desk ergonomics have been checked.  Prior situational stress noted in 2023 has resolved. Notes no jaw issues, & does not grind teeth. Discussed keeping a headache diary to see if there are any triggers and could consider sleep study if no answers to rule out sleep apnea.  Encouraged antiglare glasses at work, regular eye checks, encouraged good hydration, and we will see what lab work shows  today.    Tinnitus, bilateral  Hx of B/l tinnitus since her late 30's, mri brain done for headaches by neurology in October 2023 was reported normal, reports no problems hearing & currently declines need to see ENT or audiology at this time.  Encouraged to stay well-hydrated, sound protection, decrease salt intake & ginkgo may or may not be helpful    BMI 28.0-28.9,adult  BMI 28 encouraged regular aerobic activity and healthy eating and will check lab work today.  So far hemoglobin A1c is normal    Vegetarian  Mostly vegetarian, is on supplements B 6, B 12, folate, D 3, mineral supplement with magnesium in it, omega-3, & vit D. Will check labs today to make sure there is no nutrient deficiency.  - CBC with Platelets & Differential; Future  - Iron and iron binding capacity; Future  - Ferritin; Future  - Vitamin B12; Future  - Vitamin D Deficiency; Future  - CBC with Platelets & Differential  - Iron and iron binding capacity  - Ferritin  - Vitamin B12  - Vitamin D Deficiency    History of rib fracture  Family history of osteoporosis  History of right lateral mildly displaced sixth and eighth rib fractures as well as likely nondisplaced lateral right seventh rib fracture following a fall end of October 2023 while out of town and getting up off the toilet too fast.  Suspected due to being vasovagal from prodrome as 2 days later was diagnosed with COVID.  Does have family history of osteopenia, sister who is 1 year older than her recently got diagnosed with osteoporosis & is a former smoker & postmenopausal. DEXA scan done 11/14/2024 showed normal bone mineral density and encouraged to take calcium 1200 mg total a day to diet and supplements and vitamin D at least 1000 units daily and continued weightbearing exercises and recheck in 5 years which would be 2029.      Former smoker  Former smoker, based on hx does not meet criteria for CT lung cancer screening.      Health care maintenance  Healthcare maintenance reviewed.  "  To consider working on healthcare directives.  Mammogram yearly, consider MRI yearly,  Consider  referral given family history of breast cancer.  Pap HPV due 2028.  Colonoscopy in 2016 done for rectal bleed showed non bleeding hemorrhoids at the time, otherwise no other findings & had had no symptoms since then  & recommended  recheck in 2026.  DEXA due 2029  Vaccines reviewed.  Declines the flu shot.  Up-to-date with the COVID-vaccine.  To consider Prevnar 20 and another day.  To consider Tdap another day.  To consider Shingrex. shingles vaccines a series of 2 shots 2 to 6 months apart that can cause couple days of flu like symptoms but is expensive so to check with insurance and get at pharmacy if cheaper there.  Will do lab work today and make further recommendations once reviewed.  Will not check cholesterol as that was checked in October 2024 LDL was 114 ASCVD risk 1.1% and encouraged healthy eating and regular aerobic activity.  Return in 1 year for preventive physical and sooner in an office visit for any new concerns  - REVIEW OF HEALTH MAINTENANCE PROTOCOL ORDERS    Advanced directives, counseling/discussion    Need for immunization against influenza  Declined     Need for pneumococcal vaccination  Will do another day  - PNEUMOCOCCAL 20 VALENT CONJUGATE (PREVNAR 20); Future    Need for diphtheria-tetanus-pertussis (Tdap) vaccine  Will do another day   - TDAP 7+ (ADACEL,BOOSTRIX); Future    Need for shingles vaccine  To consider     Screening for diabetes mellitus  - Hemoglobin A1c; Future  - Hemoglobin A1c    Encounter for vitamin deficiency screening  - Vitamin B12; Future  - Vitamin D Deficiency; Future  - Vitamin B12  - Vitamin D Deficiency    Patient has been advised of split billing requirements and indicates understanding: Yes    BMI  Estimated body mass index is 28.12 kg/m  as calculated from the following:    Height as of this encounter: 1.65 m (5' 4.96\").    Weight as of this encounter: " 76.6 kg (168 lb 12.8 oz).   Weight management plan: Discussed healthy diet and exercise guidelines    Counseling  Appropriate preventive services were addressed with this patient via screening, questionnaire, or discussion as appropriate for fall prevention, nutrition, physical activity, Tobacco-use cessation, social engagement, weight loss and cognition.  Checklist reviewing preventive services available has been given to the patient.  Reviewed patient's diet, addressing concerns and/or questions.   Work on weight loss  Regular exercise  See Patient Instructions  The longitudinal plan of care for the diagnosis(es)/condition(s) as documented were addressed during this visit. Due to the added complexity in care, I will continue to support Juana in the subsequent management and with ongoing continuity of care.      Subjective   Juana is a 54 year old, presenting for the following:  Physical        3/27/2025     1:14 PM   Additional Questions   Roomed by BRENDAN Hannon   Accompanied by Self        HPI    Advance Care Planning  Patient does not have a Health Care Directive: Discussed advance care planning with patient; information given to patient to review.      3/26/2025   General Health   How would you rate your overall physical health? Excellent   Feel stress (tense, anxious, or unable to sleep) Not at all         3/26/2025   Nutrition   Three or more servings of calcium each day? Yes   Diet: Vegetarian/vegan   How many servings of fruit and vegetables per day? 4 or more   How many sweetened beverages each day? 0-1         3/26/2025   Exercise   Days per week of moderate/strenuous exercise 4 days   Average minutes spent exercising at this level 30 min         3/26/2025   Social Factors   Frequency of gathering with friends or relatives Once a week   Worry food won't last until get money to buy more No   Food not last or not have enough money for food? No   Do you have housing? (Housing is defined as stable permanent  housing and does not include staying outside in a car, in a tent, in an abandoned building, in an overnight shelter, or couch-surfing.) Yes   Are you worried about losing your housing? No   Lack of transportation? No   Unable to get utilities (heat,electricity)? No         3/26/2025   Fall Risk   Fallen 2 or more times in the past year? No   Trouble with walking or balance? No          3/26/2025   Dental   Dentist two times every year? Yes     Today's PHQ-2 Score:       3/26/2025     9:08 PM   PHQ-2 (  [employer])   Q1: Little interest or pleasure in doing things 0   Q2: Feeling down, depressed or hopeless 0   PHQ-2 Score 0    Q1: Little interest or pleasure in doing things Not at all   Q2: Feeling down, depressed or hopeless Not at all   PHQ-2 Score 0       Patient-reported         3/26/2025   Substance Use   Alcohol more than 3/day or more than 7/wk Not Applicable   Do you use any other substances recreationally? No     Social History     Tobacco Use    Smoking status: Former     Current packs/day: 0.00     Average packs/day: 0.5 packs/day for 10.6 years (5.3 ttl pk-yrs)     Types: Cigarettes     Start date: 1986     Quit date: 1997     Years since quittin.2    Smokeless tobacco: Never   Vaping Use    Vaping status: Never Used   Substance Use Topics    Alcohol use: Yes     Comment: Rarely    Drug use: No           2024   LAST FHS-7 RESULTS   1st degree relative breast or ovarian cancer No   Any relative bilateral breast cancer No   Any male have breast cancer No   Any ONE woman have BOTH breast AND ovarian cancer No   Any woman with breast cancer before 50yrs No   2 or more relatives with breast AND/OR ovarian cancer No   2 or more relatives with breast AND/OR bowel cancer No        Mammogram Screening - Mammogram every 1-2 years updated in Health Maintenance based on mutual decision making        3/26/2025   STI Screening   New sexual partner(s) since last STI/HIV test? No     History of  abnormal Pap smear: No - age 30-64 HPV with reflex Pap every 5 years recommended        Latest Ref Rng & Units 2023     1:15 PM   PAP / HPV   PAP  Negative for Intraepithelial Lesion or Malignancy (NILM)    HPV 16 DNA Negative Negative    HPV 18 DNA Negative Negative    Other HR HPV Negative Negative      ASCVD Risk   The 10-year ASCVD risk score (Clay HARLEY, et al., 2019) is: 1.1%    Values used to calculate the score:      Age: 54 years      Sex: Female      Is Non- : No      Diabetic: No      Tobacco smoker: No      Systolic Blood Pressure: 110 mmHg      Is BP treated: No      HDL Cholesterol: 85 mg/dL      Total Cholesterol: 222 mg/dL    Fracture Risk Assessment Tool  Link to Frax Calculator  Use the information below to complete the Frax calculator  : 1970  Sex: female  Weight (kg): 76.6 kg (actual weight)  Height (cm): 165 cm  Previous Fragility Fracture:  No  History of parent with fractured hip:  No  Current Smoking:  No  Patient has been on glucocorticoids for more than 3 months (5mg/day or more): No  Rheumatoid Arthritis on Problem List:  No  Secondary Osteoporosis on Problem List:  No  Consumes 3 or more units of alcohol per day: No  Femoral Neck BMD (g/cm2)           Reviewed and updated as needed this visit by Provider    Allergies  Meds  Problems  Med Hx  Surg Hx             Past Medical History:   Diagnosis Date    Abdominal pain, right lower quadrant 2018    Alternating constipation and diarrhea 2018    Anemia 1985    Arthritis of right hip 2018    Endometrial thickening on ultrasound 2018    Eustachian tube dysfunction 2012    Iron deficiency anemia due to chronic blood loss 2018    Irregular menstrual cycle 2018    Pelvic pain in female 2018    Pleuritis 2011    Rectal bleed 2016    Rectal bleeding 07/15/2016    Right foot pain 2017    xray neg    Right hip pain      Past Surgical History:    Procedure Laterality Date    COLONOSCOPY N/A 2016    Procedure: COLONOSCOPY;  Surgeon: Eber Burns MD;  Location: [facility]     OB History    Para Term  AB Living   2 1 1 0 1 1   SAB IAB Ectopic Multiple Live Births   1 0 0 0 1      # Outcome Date GA Lbr Jaycob/2nd Weight Sex Type Anes PTL Lv   2 Term 04 40w0d  3.43 kg (7 lb 9 oz) F Vag-Spont   FERNANDEZ      Name: Carolina Gu 2004             Lab work is in process  Labs reviewed in EPIC  BP Readings from Last 3 Encounters:   25 110/70   24 118/80   23 118/76    Wt Readings from Last 3 Encounters:   25 76.6 kg (168 lb 12.8 oz)   24 72.6 kg (160 lb)   23 72.6 kg (160 lb 1.1 oz)                  Patient Active Problem List   Diagnosis    Cervical cancer screening    Vegetarian    History of rib fracture    Arthralgia, unspecified joint    Myalgia    Plantar fasciitis    Episodic tension-type headache, not intractable    Situational stress    Family history of osteopenia    Former smoker    Heterogeneously dense tissue of both breasts on mammography     Past Surgical History:   Procedure Laterality Date    COLONOSCOPY N/A 2016    Procedure: COLONOSCOPY;  Surgeon: Eber Burns MD;  Location: [facility]       Social History     Tobacco Use    Smoking status: Former     Current packs/day: 0.00     Average packs/day: 0.5 packs/day for 10.6 years (5.3 ttl pk-yrs)     Types: Cigarettes     Start date: 1986     Quit date: 1997     Years since quittin.2    Smokeless tobacco: Never   Substance Use Topics    Alcohol use: Yes     Comment: Rarely     Family History   Problem Relation Age of Onset    Alcoholism Mother     Thyroid Disease Mother     Diverticulitis Mother     Uterine Cancer Mother         hyst 2023    Cancer Father         lung CA, smoker    Alcoholism Father     Cancer Maternal Grandmother         Breast CA    Alzheimer Disease Paternal Grandmother     Diabetes No family hx of      Coronary Artery Disease No family hx of     Hypertension No family hx of     Hyperlipidemia No family hx of     Cerebrovascular Disease No family hx of     Breast Cancer No family hx of     Colon Cancer No family hx of     Prostate Cancer No family hx of     Other Cancer No family hx of     Depression No family hx of     Anxiety Disorder No family hx of     Mental Illness No family hx of     Substance Abuse No family hx of     Anesthesia Reaction No family hx of     Asthma No family hx of     Osteoporosis No family hx of     Genetic Disorder No family hx of     Obesity No family hx of     Unknown/Adopted No family hx of          Current Outpatient Medications   Medication Sig Dispense Refill    Cyanocobalamin (VITAMIN B 12 PO) Take 800 mcg by mouth daily      Docosahexaenoic Acid (DHA PO) Take 1,250 mg by mouth daily      Folic Acid (FOLATE PO) Take 1,333 mg by mouth daily      Omega-3 Fatty Acids (OMEGA 3 PO) Take 2,200 mg by mouth daily      Pyridoxine HCl (VITAMIN B6) 250 MG TABS Take 250 mg by mouth daily      Vitamin D3 (VITAMIN D, CHOLECALCIFEROL,) 25 mcg (1000 units) tablet Take 1 tablet by mouth daily       Allergies   Allergen Reactions    Sulfa Antibiotics Rash     Recent Labs   Lab Test 10/10/24  1320 22  0913 18  1204   *  --  66   HDL 85  --  84   TRIG 117  --  102   ALT  --  12 21   CR  --  0.82 0.79   GFRESTIMATED  --  86 77   GFRESTBLACK  --   --  >90   POTASSIUM  --  4.4 4.1   TSH  --  1.50 2.19           BACKGROUND  54 year-old  2 para 1-0-1-1, former smoker, BMI 25 to 28, mostly vegetarian, on supplements, resolved history of anemia  & iron deficiency in the past, noted to have had long-standing anemia reported diagnosed at age 5 and then chronically anemic lost track of it initially as a child, recalled being told was on fortified cereal, than not addressed until  was an adult when it came up as a diagnosis, hx of irregular menstrual cycle, endometrial thickening  noted on a pelvic ultrasound in 2028 but normal in 2022, history of right intermittent low quadrant pain, previously told was due to ovaries but pelvic ultrasounds have been unremarkable, hx of right hip pain x-ray was unremarkable at [facility] 20[facility], hx of bilateral trochanteric bursitis, resolved myalgias arthralgias with negative autoimmune workup September 2023, b/l thumb pain hx of right foot pain in the past, history of rib fracture following fall November 2023, history of possible vasovagal syncope contributing to that fall November 2023, likely prodromal prior to development of COVID diagnosed couple days later, history of plantar fasciitis, episodic tension type headaches, CT head in [facility],  normal labs in August and September 2023 & seen by neurology and MRI head in October 2023 reported normal, resolved situational stress did therapy, family history of osteopenia and osteoporosis, DEXA scan in 2024 normal,, hx of eustachian tube dysfunction & chronic b/l tinnitus, hx of resolved pleuritis, hx of bright red blood per rectum status post colonoscopy which showed internal hemorrhoids non bleeding at the time in 2016, heterogeneously dense breast tissue noted bilaterally on mammogram, allergic to sulfa drugs, previously on  mg, DHEA 1250 mg, collagen peptide 17.5 g daily, cod liver oil liquid 1 capful, now just on B 6 250 mg, B 12 800 mcg, folate 133 3 mg, vitamin D 3 1000 units, omega-3 2200 mg, under care of gynecologist Dr. Browning, and her naturopath     Seen for the first time November 6, 2018 for right lower quadrant pain, irregular menstrual bleeding, pelvic pain, rectal bleeding, alternating constipation and diarrhea, body aches, generalized weakness. Workup revealed a normal exam.  CBC showed mild anemia hemoglobin of 10.5 with microcytic hypochromic indices and low iron of 23, normal B 12, x-ray showed mild right hip arthritis worse since 2013 and CMP, lipids, TSH were normal  and urine culture done for questionable UA was unremarkable. Pelvic U/S 11/9/18 showed an abnormally thickened endometrium with mild heterogeneity, measuring up to 22 mm &advised to consider OB/GYN consultation,  Seen 11/26/2018 & noted while had looked at the low fodmaps diet, did not notice any change but had not tried it long enough.  Wondered about a probiotic and seeing GI.  Declined mammogram as felt grandmother had breast cancer post menopause. Pelvic Pap up-to-date and due again in 2021.  Declined flu shot and HIV testing.  Noted that her insurance was changing and she had a new job to start in December 2018 and was to be  switching to [facility].  So declined any referrals until  proceeded with the new insurance.  Had trouble taking iron supplements as they would make her constipation worse.  Plus being a vegetarian it was hard for her to get vegetarian iron supplements that worked for her.  Would be interested in seeing a hematologist to workup her symptoms in addition to seeing GYN and GI. These referrals were in place. Not seen elsewhere and lost to follow up  Seen by gyn 3/10/21 when no mention made of RLQ pain or cycle and seen for routine physical and pap noted not due then till 2023, declined mammogram and STI testing done due to presence of a new partner since last tested & gc, HIV and syphilis came back negative. Seen by dermatology 5/2022. Seen next in  on 5/14/22 for RLQ pain of reported 1 week at the time. Exam, UA, HCG, & CBC were normal. A pelvic u/s was done that was normal with mild heterogenous look to the uterine myometrium.     Seen 6/23/22 for right lower quadrant pain, felt was new since mid May 2022 but similar to 2018 when seen for similar symptoms. Exam, u/s , labs to date unremarkable. Differentials included musculoskeletal, versus hernia ( non efelt or seen)  versus appendix abscess( exam not suggestive of this)  versus worsening hip arthritis ( symptoms seemed higher up  then where would expect groin pain) versus uterine causes versus constipation. Did not appear ill and an acute abdomen was not present. Unclear etiology/diagnosis with uncertain prognosis requiring further workup Desired due to cost to go in step wise fashion. Felt strongly it was uterine so to start with gyn first. & referral given. If no answer there to consider a ct abdomen. If no answer after that to investigate prior noted right hip arthritis as potential contributor to pain encouraged to return in 3 months for a physical but to contact us sooner if anything changed.  Labs done was negative for hepatitis C, with normal CBC, CMP, iron, ferritin of 12, B 12 and thyroid.  Later also requested evaluation for autoimmune and ESR CRP RF and CCP ordered but did not get done.     Seen by gynecology 9/16 for right lower quadrant pain and pelvic pain and a pelvic ultrasound was ordered.  This did not get done.  Seen next at [facility] 6/2/2023 for bilateral hip pain x-rays were unremarkable diagnosed with trochanteric bursitis and advised physical therapy.  Lab work on 6/27/2023 at [facility] for Lyme's was negative. Did physical therapy at [facility] and discharged from therapy 7/17/2023.  Seen 8/21/2023 by [facility] for new onset episodic headache.  CT head was negative.  CBC, CRP was normal.  Diagnosed with tension headaches and also given Imitrex to try but did not start this.  Also discussed his intermittent transient generalized abdominal pain UA was negative and was advised to return for more evaluation if needed.  Seen next by Dr. Kishan Michelle at [facility] on 9/5/2023 for physical but mostly concerns or myalgias and arthralgias and concern for an autoimmune condition.  Lab work revealed normal testing with negative for celiac, normal CRP, normal RAHAT, normal TSH, normal CK, normal CBC, CMP, lipids HIV and hepatitis C testing.  Was referred to neurology.  As was worried about MS.  Seen by  neurology outside care everywhere and MRI reported done 10/4/2023 and on note patient later forwarded to me showed nonspecific white matter findings, assessed to be normal and no further recommendations given.  End of October while outside the state on a vacation in a hotel at night getting up from the toilet had a fall resulting in right-sided rib pain seen in urgent care on 11/1 assessed to have couple mildly displaced lateral rib fractures and a nondisplaced rib fracture with no pneumothorax minimal pleural effusion and given supportive care and some Vicodin.  2 days later tested positive for COVID.  Seen by Dr. Bruce 11/6 for follow-up on fractures and advised to consider DEXA scan.  Short-term disability and FMLA paperwork filled by Dr. Bruce.  Seen by gynecology 12/5 for annual physical noted menopausal since July 2022, no significant hot flashes declined need for medication Pap HPV done was normal.  Discussed HRT and shingles vaccine.    Seen 12/14/23 for history of right lateral mildly displaced sixth and eighth rib fractures as well as likely nondisplaced lateral right seventh rib fracture following a fall end of October 2023 while out of town and getting up off the toilet too fast. Fracture could have been due to fragility versus something else, labs, dexa and mammogram ordered to evaluate more. Recent labs 6/ 2023 to date normal in care everywhere. Had been avoiding a bra which caused some breast discomfort bilaterally like a pulling, but no discomfort in the breast tissue itself, breast exam by gynecology normal recently.  Advised to splint using hands to hold right rib cage while coughing or sneezing. To continue with yoga stretching exercises & could  refer to physical therapy if not better. Counseled it could take a few months to get fully better.  History of near syncope/ syncope causing fall in oct 2023 While vasovagal likely reason for fall in October causing rib fracture discussed could not rule  out other causes. May have been prodromal as was diagnosed with COVID just 2 days later. No clear known loss of consciousness. Of note was seen for headaches, & ct head and labs normal in aug / sept 2023 at [facility] & seen by neurology outside [employer] and MRI brain for history of headaches in early October 2023 reported had been unremarkable. Exam revealed normal vitals, carotids & heart sounds & exam was benign and lopez had no recurrence.  Concern for seizures and a cardiac cause for falling seem low.  Will defer doing carotid ultrasound or echocardiogram at this time but recommend might be good to do a Zio patch to assess rhythm to complete syncopal workup.  Encouraged to stay well-hydrated and change posture slowly, avoid straining with bowel movements or when emptying bladder.  Originally had made appointment for muscle aches & joint pains. Was told had history of right hip osteoarthritis in the past, & bilateral trochanteric bursitis in 2022.  Was tested negative for Lyme's in June 2023.  Lab work in September 2023 at [facility] revealed no autoimmune or medical concerns.  When stressed and anxious, muscles could get tight and can cause aches and pains.  Metabolism and normal physiologic changes in the body as one aged could also contribute to aches and pains as could some arthritis. Was to continue with supportive care like ice and heat to affected areas as needed, over-the-counter pain creams like Arnica or lidocaine gel or diclofenac gel.  While ibuprofen was a good anti-inflammatory, frequent use of this could increase risk of gastritis/ ulcers, heart attack & kidney problems. Tylenol was a safe option to use for pain though not a very good anti-inflammatory agent. Could try a teaspoon of honey a day.  And eat a low inflammatory diet, low in simple carb's and stress management would also help with pain.  Discussed acupuncture was a good option as well as massage. Was to continue with stretching  exercises given by yoga therapist.  Hx of plantar fasciitis, recommended avoiding walking barefoot, wear stiff soled shoes, do some stretching like rolling foot on a frozen bottle of water daily.  & to follow-up with her therapist as planned regarding this.  Episodic tension headaches, noted normal CT head in [facility] normal labs in August and September & seen by neurology and MRI head in October reported normal. Discussed that unconscious clenching of jaw due to tension and stress could also contribute to tension headaches. Encouraged plenty of fluids, limit screen time, antiglare glasses could be helpful, & to continue with magnesium and B 6 to help prevent headaches.  Noted situational stress and encouraged to see a therapist as planned.  Noted was mostly vegetarian & on supplements B 6, B 12, folate, D 3, omega-3, EPA DHA and collagen.  Lab work recently all unremarkable, did check some additional tests of iron ferritin B 12 and vitamin D and these all came back normal other than the ferritin was 14 and encouraged increased iron intake  Due to family history of osteopenia, mostly vegetarian long time, recent rib fracture and having been a former smoker was encouraged screening for osteoporosis with a DEXA scan.  Encouraged to take calcium up to 1200 mg total a day via diet or supplements and vitamin D at least 2000 units daily.  Weightbearing exercises would be helpful.  Recent screen at the chiropractor revealed decreased muscular mass & to continue with lean protein intake and cross training and some weightbearing exercises to help maintain muscle mass & prevent falls.  Former smoker based on history reviewed, did not need CT lung cancer screening. Lungs were clear on exam. Smoking history could have contributed to potential decreased bone mineral density.   Healthcare maintenance reviewed. To consider working on healthcare directives. Due for screening mammogram and orders placed to schedule once ribs  felt better. Advised if continuing to have breast tenderness then could benefit from a diagnostic mammogram as opposed to screening mammogram. I suspected the pulling sensation on each side of the breast was more related to no longer wearing a bra due to recent rib fracture or that day.    Recommended updated COVID vaccination, to get another day. Discussed due for Tdap and to get another day. To consider Shingrex.  Noted Colonoscopy in 2016 done for rectal bleed showed non bleeding hemorrhoids at the time, otherwise no other findings & had had no symptoms since then  & recommended  recheck in 2026. Lipids done in September 2023 had been normal.  BMI a little over 25. Encouraged to continue to eat healthy and stay active. Was to follow-up as needed, Next physical due fall of 2024    Notes from naturopath reviewed.  On 4/28/2024 noted a tick bite not concerning for Lyme's but had cellulitis left auricle and treated with Keflex.  Seen at outside facility for dental care in September 2024, LDL noted elevated at 114 in October 2024 ASCVD risk was 1.1%.  No-show to GYN 11/24.  On 11/14 DEXA scan done showed normal bone mineral density to continue calcium vitamin D and recheck in 5 years so 2029.  On 1121 mammogram showed heterogeneously dense breasts.  [facility] negative    CURRENTLY   Here for a physical.     Breast no symptoms, ok to exam, occasional right breast tenderness, similar to before like a pulling sensation, no pattern noted post menopausal 3 yrs, no symptoms currently, reports no difference with wearing different bras on with no bra.  Mammogram done 11/21/2024 showed heterogeneously dense breasts, which may obscure small masses.There was no radiographic evidence of malignancy. FH of mat gm with breast cancer dx in her late 70 to 80's.  Minnie score 16.4%.  More than that of the general population but less than 20%.  Could consider MRI breast.  Could consider  consult in the future if desired if  covered by insurance.    Pap HPV due in 2028.  Noted remote history of abnormal Pap age 23 that resolved,  in our system we have Pap results since 2013 noted normal, 2013 NILM & then 2018 age 47 NILM, neg HPV, From visit on 4/10/18: due to report of remote abnormal pap was advised Co-test 4/2023. On 12/5/23 NIL, Neg HPV. & advised 5 yr co-test ( 2028)    In October noted right lower abdominal pain above the inguinal ligament that lasted half a day.  Not as long as previous when told had a possible burst ovarian cyst.  Also saw the tiniest amount of pink color on wiping on the tissue paper.  It occurred once after intercourse and once without.  Has had no bleeding or spotting since October 2024 just the 2 times.  Of note pelvic ultrasound in 2018 for the right lower abdominal pain had shown mildly abnormal thickened endometrium and mild heterogeneity, some subendometrial cysts along with enlarged bulbous uterus that could be seen with adenomyosis & was referred to OB/GYN at that time.  Records not available from that time, may have been seen at [facility]. In 2022 had a pelvic ultrasound again due to right lower quadrant pain and it showed mildly heterogenous uterine myometrium but otherwise normal.  X-rays in 2023 did not point towards hip arthritis.  Reports a history of irritable bowel syndrome but feels the symptoms are different from the right lower abdominal pain she experienced in October and previously.  Reports a history of cervical polyps in the past. There is family history of underactive thyroid. Has had no significant hot flashes or vaginal dryness that is bothersome. There is family history of endometrial cancer in mother. Noted she has an appointment to see a gynecologist coming up next week.  Discussed since postmenopausal 3 years approximately and given history of this intermittent recurrent right lower quadrant pain and spotting & a family history of endometrial cancer ,it would be wise to get a  pelvic ultrasound. Will defer pelvic exam to upcoming GYN visit. She may plan to get the ultrasound and reschedule the Gyn visit after the results are in. Currently has no symptoms. Agreeable to lab work today.      Prior noted muscle aches and joint pains resolved.  No longer with bilateral trochanteric bursitis.  Workup in 2023 at [facility] showed negative Lyme's and autoimmune testing. Plantar fasciitis got better.  No longer on collagen supplements.    Hx of episodic tension headaches, CT head for this with labs in August and September 2023 at [facility] had been normal & seen by neurology and an MRI head done in October 2023 reported normal. No associated vision changes, nausea, vomiting. no tingling or numbness or weakness or gait issues.  Feels a dull headache top of head not quite every day but always there.  Has not really been tracking it.  Tends to avoid coffee but does drink some green tea rare black tea and decaf coffee and chocolate.  Has not associated any symptoms with or without caffeine.  Reports no allergies or nasal congestion.  Prior noted vasovagal syncope resulting in a fall in October 2023 has not reoccurred.  When seen in December 2023 it was suspected likely prodromal due to COVID diagnosed 2 days later.  Exam has been unremarkable in the past in terms of carotid and heart and concern for seizures and a cardiac cause of falling was low.  Feels wakes up refreshed in general, no known snoring.  Feels is well-hydrated.  And desk ergonomics have been checked.  Prior situational stress noted in 2023 has resolved. Notes no jaw issues, & does not grind teeth. Discussed keeping a headache diary to see if there are any triggers and could consider sleep study if no answers to rule out sleep apnea.  Encouraged antiglare glasses at work, regular eye checks, encouraged good hydration, and we will see what lab work shows today.    Hx of B/l tinnitus since her late 30's, primitivo brain done for  headaches by neurology in October 2023 was reported normal, reports no problems hearing & currently declines need to see ENT or audiology at this time.  Encouraged to stay well-hydrated, sound protection, decrease salt intake & ginkgo may or may not be helpful    BMI 28 encouraged regular aerobic activity and healthy eating and will check lab work today.    Mostly vegetarian, is on supplements B 6, B 12, folate, D 3, mineral supplement with magnesium in it, omega-3, & vit D. Will check labs today to make sure there is no nutrient deficiency.    History of right lateral mildly displaced sixth and eighth rib fractures as well as likely nondisplaced lateral right seventh rib fracture following a fall end of October 2023 while out of town and getting up off the toilet too fast.  Suspected due to being vasovagal from prodrome as 2 days later was diagnosed with COVID.  Does have family history of osteopenia, sister who is 1 year older than her recently got diagnosed with osteoporosis & is a former smoker & postmenopausal. DEXA scan done 11/14/2024 showed normal bone mineral density and encouraged to take calcium 1200 mg total a day to diet and supplements and vitamin D at least 1000 units daily and continued weightbearing exercises and recheck in 5 years which would be 2029.      Former smoker, based on hx does not meet criteria for CT lung cancer screening.       Healthcare maintenance reviewed.   To consider working on healthcare directives.  Vaccines reviewed.  Declines the flu shot.  Up-to-date with the COVID-vaccine.  To consider Prevnar 20 and another day.  To consider Tdap another day.  To consider Shingrex. shingles vaccines a series of 2 shots 2 to 6 months apart that can cause couple days of flu like symptoms but is expensive so to check with insurance and get at pharmacy if cheaper there.  Will do lab work today and make further recommendations once reviewed.  Will not check cholesterol as that was checked in  "October 2024 LDL was 114 ASCVD risk 1.1% and encouraged healthy eating and regular aerobic activity.    Review of Systems  Constitutional, HEENT, cardiovascular, pulmonary, GI, , musculoskeletal, neuro, skin, endocrine and psych systems are negative, except as otherwise noted.     Objective    Exam  /70 (BP Location: Right arm, Patient Position: Sitting, Cuff Size: Adult Regular)   Pulse 65   Temp 97.1  F (36.2  C) (Temporal)   Resp 22   Ht 1.65 m (5' 4.96\")   Wt 76.6 kg (168 lb 12.8 oz)   LMP  (LMP Unknown)   SpO2 98%   BMI 28.12 kg/m     Estimated body mass index is 28.12 kg/m  as calculated from the following:    Height as of this encounter: 1.65 m (5' 4.96\").    Weight as of this encounter: 76.6 kg (168 lb 12.8 oz).    Physical Exam  GENERAL: alert and no distress  EYES: Eyes grossly normal to inspection, PERRL and conjunctivae and sclerae normal, glasses  HENT: ear canals and TM's normal, nose and mouth without ulcers or lesions  NECK: no adenopathy, no asymmetry, masses, or scars  RESP: lungs clear to auscultation - no rales, rhonchi or wheezes  BREAST: normal without masses, tenderness or nipple discharge and no palpable axillary masses or adenopathy  CV: regular rate and rhythm, normal S1 S2, no S3 or S4, no murmur, click or rub, no peripheral edema  ABDOMEN: soft, nontender, no hepatosplenomegaly, no masses and bowel sounds normal  MS: no gross musculoskeletal defects noted, no edema  SKIN: no suspicious lesions or rashes, few pigmented nevi back & arms & legs   NEURO: Normal strength and tone, mentation intact and speech normal  PSYCH: mentation appears normal, affect normal/bright  LYMPH: no cervical, supraclavicular, axillary, or inguinal adenopathy    Signed Electronically by: Reena Mccoy MD  "

## 2025-03-27 NOTE — RESULT ENCOUNTER NOTE
Hello -    Here are my comments about your recent results:  -Normal red blood cell (hgb) levels, normal white blood cell count and normal platelet levels.  -A1C (diabetic test) is normal and indicates that your blood sugar has been in a normal range the last 3 months.  -Urine is normal.  For additional lab test information, [email or web address] is an excellent reference..    Please let us know if you have any questions or concerns.     Regards,  Reena Mccoy MD

## 2025-03-28 ENCOUNTER — ANCILLARY PROCEDURE (OUTPATIENT)
Dept: ULTRASOUND IMAGING | Facility: CLINIC | Age: 55
End: 2025-03-28
Attending: FAMILY MEDICINE
Payer: COMMERCIAL

## 2025-03-28 DIAGNOSIS — Z87.42 HISTORY OF POSTMENOPAUSAL BLEEDING: ICD-10-CM

## 2025-03-28 DIAGNOSIS — R10.31 ABDOMINAL PAIN, RIGHT LOWER QUADRANT: ICD-10-CM

## 2025-03-28 PROCEDURE — 76856 US EXAM PELVIC COMPLETE: CPT | Performed by: RADIOLOGY

## 2025-03-28 PROCEDURE — 76830 TRANSVAGINAL US NON-OB: CPT | Performed by: RADIOLOGY

## 2025-03-31 NOTE — RESULT ENCOUNTER NOTE
Hello -    Here are my comments about your recent results:  Pelvic ultrasound shows no fibroid, the muscle of the uterus is heterogeneous with difficulty to assess it well on the pelvic ultrasound.  The ovaries look normal.  If desired we could do a pelvic MRI to evaluate more I could refer you to gynecology or you can see them as planned      Please let us know if you have any questions or concerns.     Regards,  Reena Mccoy MD

## 2025-04-02 ENCOUNTER — TRANSFERRED RECORDS (OUTPATIENT)
Dept: HEALTH INFORMATION MANAGEMENT | Facility: CLINIC | Age: 55
End: 2025-04-02
Payer: COMMERCIAL